# Patient Record
Sex: FEMALE | Race: WHITE | HISPANIC OR LATINO | Employment: UNEMPLOYED | URBAN - METROPOLITAN AREA
[De-identification: names, ages, dates, MRNs, and addresses within clinical notes are randomized per-mention and may not be internally consistent; named-entity substitution may affect disease eponyms.]

---
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
## 2019-06-06 ENCOUNTER — COMMUNICATION - HEALTHEAST (OUTPATIENT)
Dept: PEDIATRICS | Facility: CLINIC | Age: 19
End: 2019-06-06

## 2019-07-10 ENCOUNTER — COMMUNICATION - HEALTHEAST (OUTPATIENT)
Dept: PEDIATRICS | Facility: CLINIC | Age: 19
End: 2019-07-10

## 2020-01-11 ENCOUNTER — OFFICE VISIT - HEALTHEAST (OUTPATIENT)
Dept: FAMILY MEDICINE | Facility: CLINIC | Age: 20
End: 2020-01-11

## 2020-01-11 DIAGNOSIS — H65.191 OTHER NON-RECURRENT ACUTE NONSUPPURATIVE OTITIS MEDIA OF RIGHT EAR: ICD-10-CM

## 2020-01-11 DIAGNOSIS — J10.1 INFLUENZA DUE TO INFLUENZA VIRUS, TYPE A, HUMAN: ICD-10-CM

## 2020-01-11 DIAGNOSIS — R05.9 COUGH: ICD-10-CM

## 2020-01-11 LAB
DEPRECATED S PYO AG THROAT QL EIA: NORMAL
FLUAV AG SPEC QL IA: ABNORMAL
FLUBV AG SPEC QL IA: ABNORMAL

## 2020-01-11 RX ORDER — IBUPROFEN 200 MG
200 TABLET ORAL EVERY 6 HOURS PRN
Status: SHIPPED | COMMUNITY
Start: 2020-01-11

## 2020-01-11 RX ORDER — ACETAMINOPHEN 325 MG/1
650 TABLET ORAL EVERY 6 HOURS PRN
Status: SHIPPED | COMMUNITY
Start: 2020-01-11

## 2020-01-12 LAB — GROUP A STREP BY PCR: NORMAL

## 2021-05-30 VITALS — WEIGHT: 112.5 LBS | BODY MASS INDEX: 19.93 KG/M2

## 2021-05-30 VITALS — HEIGHT: 63 IN | BODY MASS INDEX: 19.51 KG/M2 | WEIGHT: 110.1 LBS

## 2021-05-30 VITALS — BODY MASS INDEX: 20.42 KG/M2 | WEIGHT: 115.3 LBS

## 2021-05-30 NOTE — PROGRESS NOTES
General Health Maintenance letter sent julius Rivera.    Tara AGUAYO CMA ([facility])

## 2021-05-31 VITALS — HEIGHT: 63 IN | WEIGHT: 109 LBS | BODY MASS INDEX: 19.31 KG/M2

## 2021-05-31 VITALS — WEIGHT: 112.3 LBS | BODY MASS INDEX: 19.9 KG/M2 | HEIGHT: 63 IN

## 2021-05-31 VITALS — BODY MASS INDEX: 19.26 KG/M2 | WEIGHT: 109.6 LBS

## 2021-05-31 VITALS — BODY MASS INDEX: 19.56 KG/M2 | WEIGHT: 111.3 LBS

## 2021-05-31 VITALS — WEIGHT: 112.9 LBS | BODY MASS INDEX: 19.84 KG/M2

## 2021-05-31 VITALS — BODY MASS INDEX: 20.4 KG/M2 | WEIGHT: 116.1 LBS

## 2021-05-31 VITALS — BODY MASS INDEX: 19.84 KG/M2 | HEIGHT: 63 IN | WEIGHT: 112 LBS

## 2021-06-01 VITALS — BODY MASS INDEX: 19.18 KG/M2 | WEIGHT: 108.3 LBS

## 2021-06-01 VITALS — BODY MASS INDEX: 19.03 KG/M2 | HEIGHT: 63 IN | WEIGHT: 107.4 LBS

## 2021-06-04 VITALS
TEMPERATURE: 99 F | DIASTOLIC BLOOD PRESSURE: 68 MMHG | HEART RATE: 94 BPM | RESPIRATION RATE: 16 BRPM | WEIGHT: 120 LBS | BODY MASS INDEX: 21.09 KG/M2 | SYSTOLIC BLOOD PRESSURE: 114 MMHG | OXYGEN SATURATION: 96 %

## 2021-06-08 ENCOUNTER — OFFICE VISIT - HEALTHEAST (OUTPATIENT)
Dept: FAMILY MEDICINE | Facility: CLINIC | Age: 21
End: 2021-06-08

## 2021-06-08 DIAGNOSIS — R10.84 ABDOMINAL PAIN, GENERALIZED: ICD-10-CM

## 2021-06-08 LAB
ANION GAP SERPL CALCULATED.3IONS-SCNC: 10 MMOL/L (ref 5–18)
BUN SERPL-MCNC: 5 MG/DL (ref 8–22)
CALCIUM SERPL-MCNC: 9.2 MG/DL (ref 8.5–10.5)
CHLORIDE BLD-SCNC: 108 MMOL/L (ref 98–107)
CO2 SERPL-SCNC: 23 MMOL/L (ref 22–31)
CREAT SERPL-MCNC: 0.63 MG/DL (ref 0.6–1.1)
ERYTHROCYTE [DISTWIDTH] IN BLOOD BY AUTOMATED COUNT: 12.6 % (ref 11–14.5)
GFR SERPL CREATININE-BSD FRML MDRD: >60 ML/MIN/1.73M2
GLUCOSE BLD-MCNC: 69 MG/DL (ref 70–125)
HCT VFR BLD AUTO: 37.9 % (ref 35–47)
HGB BLD-MCNC: 12.7 G/DL (ref 12–16)
MCH RBC QN AUTO: 29.9 PG (ref 27–34)
MCHC RBC AUTO-ENTMCNC: 33.5 G/DL (ref 32–36)
MCV RBC AUTO: 89 FL (ref 80–100)
PLATELET # BLD AUTO: 251 THOU/UL (ref 140–440)
PMV BLD AUTO: 10.3 FL (ref 7–10)
POTASSIUM BLD-SCNC: 4.1 MMOL/L (ref 3.5–5)
RBC # BLD AUTO: 4.25 MILL/UL (ref 3.8–5.4)
SODIUM SERPL-SCNC: 141 MMOL/L (ref 136–145)
WBC: 6.3 THOU/UL (ref 4–11)

## 2021-06-08 RX ORDER — DEXTROAMPHETAMINE SACCHARATE, AMPHETAMINE ASPARTATE, DEXTROAMPHETAMINE SULFATE AND AMPHETAMINE SULFATE 3.75; 3.75; 3.75; 3.75 MG/1; MG/1; MG/1; MG/1
15 TABLET ORAL PRN
Status: SHIPPED | COMMUNITY
Start: 2021-06-08

## 2021-06-08 ASSESSMENT — MIFFLIN-ST. JEOR: SCORE: 1266.2

## 2021-06-08 NOTE — PROGRESS NOTES
[facility] Pediatric Acute Visit     HPI:  Stefanie Sinclair is a 16 y.o. female who presents to the clinic with a four day history fever and sore throat. Fever has been up to 104 F. She's also had chills and body aches. Family has been giving either acetaminophen (500 mg) or ibuprofen (400 mg)every four hours, alternating. Stefanie noticed that her tonsils looked bigger and had white spots on them. Her voice sounds different, and has noticed swollen lymph nodes in her neck. She's had a mild cough, no significant nasal congestion. She denies abdominal pain, vomiting or diarrhea. No known sick contacts.     Past Med / Surg History:  Past Medical History   Diagnosis Date     Chicken pox      History of sprained elbow 2012     left     Viral Infection      Created by Conversion      No past surgical history on file.    Fam / Soc History:  Family History   Problem Relation Age of Onset     Hypertension Mother      Hypertension Maternal Grandmother      Diabetes Maternal Grandfather      Prostate cancer Maternal Grandfather      ADD / ADHD Father      Alcohol abuse Father      Anxiety disorder Father      ADD / ADHD Brother      Tuberculosis Paternal Grandfather      Diabetes Paternal Grandfather      Schizophrenia Paternal Grandmother      Anxiety disorder Paternal Grandmother      Social History     Social History Narrative    She lives at home with her mother during the week and sees her father on the weekends. Her parents  6 years ago. She has an older brother and 2 younger brothers.         She is a senior at [employer].         She was born in [place]     ROS:  Gen: See HPI  Eyes: No eye discharge  ENT: See HPI  Resp: No SOB, cough or wheezing  GI: No diarrhea, nausea or vomiting  : No dysuria  MS: Positive for myalgias  Skin: No rashes  Neuro: No headaches  Lymph/Hematologic: See HPI     Objective:  Vitals:   Visit Vitals     /66     Temp 98.2  F (36.8  C) (Oral)     Wt 112 lb  8 oz (51 kg)     Gen: Alert, well appearing  ENT: TMs normal bilaterally, no nasal congestion or rhinorrhea, tonsils are enlarged and erythematous with exudates, moist mucosa  Eyes: Conjunctivae clear bilaterally  Heart: Regular rate and rhythm; normal S1 and S2; no murmurs, gallops, or rubs  Lungs: Unlabored respirations; clear breath sounds  Skin: Normal without lesions  Hematologic/Lymph/Immune: Bilateral cervical and submandibular lymphadenopathy    Pertinent results / imaging:  Rapid Strep Antigen:  Positive    Assessment and Plan:    Stefanie Sinclair is a 16  y.o. 3  m.o. female with what is likely a viral illness causing fever, tonsillitis, myalgias and lymphadenopathy. Rapid strep was negative. Offered swabbing for influenza or testing for mono, but at this point, encouraged watchful waiting as she's outside the window for treating influenza, and she's perhaps a little early into illness to rely on monospot.      Supportive treatment including fluids, rest and analgesics    Follow up throat culture    Encouraged follow up if no better through the end of the week, sooner if she gets worse    Sharmaine Carrizales MD  1/24/2017

## 2021-06-09 NOTE — PROGRESS NOTES
[facility] Pediatric Acute Visit     HPI:  Stefanie Sinclair is a 16 y.o. female who presents to the clinic with at least a three week history of sore throat. She was seen on 1/24/17, with a sore throat, and she tested negative for strep and mono. I diagnosed her with a viral URI. After seeing her, her pharyngitis got better for a few days, then recurred. She's also noticed a dry cough for over a week along with rhinorrhea and some nasal congestion. She's also had some eye redness and pain. She's sneezing more as well. No fever or otalgia. No appetite changes, vomiting or diarrhea. She has no known sick contacts.    Stefanie also carries a diagnosis for ADHD, combined type, diagnosed at [facility] I believe in March of 2015. Stefanie was on Concerta from May 2015 - April 2016, but it sounds like Stefanie wasn't sure how well it worked, plus she developed a rash on her face. Stefanie saw Dr. Mueller on 8/25/16, and she started Stefanie on Adderall XR, 10 mg in the morning. Stefanie never filled this prescription as her mom really has wanted to avoid these medications for Stefanie. However, family has become increasingly concerned that Stefanie is not doing well in school because of her inability to focus. She has trouble completing her exams as well as her homework. She feels easily distracted, and it is hard to sit still to focus. They would like to try the Adderall at this point.    Past Med / Surg History:  Past Medical History:   Diagnosis Date     Chicken pox      History of sprained elbow 2012    left     Viral Infection     Created by Conversion      No past surgical history on file.    Fam / Soc History:  Family History   Problem Relation Age of Onset     Hypertension Mother      Hypertension Maternal Grandmother      Diabetes Maternal Grandfather      Prostate cancer Maternal Grandfather      ADD / ADHD Father      Alcohol abuse Father      Anxiety disorder Father      ADD / ADHD Brother      Tuberculosis  Paternal Grandfather      Diabetes Paternal Grandfather      Schizophrenia Paternal Grandmother      Anxiety disorder Paternal Grandmother      Social History     Social History Narrative    She lives at home with her mother during the week and sees her father on the weekends. Her parents  6 years ago. She has an older brother and 2 younger brothers.         She is a senior at [employer].         She was born in [place]     ROS:  Gen: No fever or fatigue  Eyes: See HPI  ENT: See HPI  Resp: No SOB, cough or wheezing.  GI:No diarrhea, nausea or vomiting  :No dysuria  MS: No joint/bone/muscle tenderness.  Skin: No rashes  Neuro: No headaches  Lymph/Hematologic: No gland swelling    Objective:  Vitals:   Visit Vitals     BP 98/64     Pulse 80     Temp 97.9  F (36.6  C) (Oral)     Wt 115 lb 4.8 oz (52.3 kg)     SpO2 100%     Gen: Alert, well appearing  ENT: TMs normal bilaterally; no significant nasal congestion or rhinorrhea; posterior pharynx mildly erythematous without exudates, tonsils are hypertrophied, grade 3; moist mucosa  Eyes: Conjunctivae clear bilaterally  Heart: Regular rate and rhythm; normal S1 and S2; no murmurs, gallops, or rubs  Lungs: Unlabored respirations; clear breath sounds, no crackles or wheezing  Skin: Normal without lesions  Neuro: Oriented. Normal reflexes; normal tone; no focal deficits appreciated. Appropriate for age.  Hematologic/Lymph/Immune: Submandibular cervical lymphadenopathy  Psychiatric: Appropriate affect    Pertinent results / imaging:  Reviewed note from Dr. Mueller from 8/25/16    Assessment and Plan:    Stefanie Sinclair is a 16  y.o. 5  m.o. female with persistent pharyngitis along with dry cough, rhinorrhea, sneezing and eye symptoms that I suspect are secondary to seasonal/environmental allergies and allergic rhinitis. Her tonsils are hypertrophied and seem to be chronically enlarged per family's report. Stefanie also has a diagnosis of ADHD,  combined type, and family would like to try the Adderall they were prescribed back in August, but never filled.      Recommended Flonase 50 mcg/actuation nasal spray per nostril daily; I did prescribe this but warned family that it may not be covered since it is over the counter    Okay to try cetrizine or loratadine daily as well    Referral ordered to ENT regarding tonsillar hypertrophy, but can also discuss chronic pharyngitis if treatment for allergies is not helpful    Refilled Adderall XR 10 mg 24 hr capsule, take 1 capsul (10 mg) by mouth every morning    Counseled family about following up in 3-4 weeks for a med check; also that this prescription is filled on a monthly basis, so they need to call every month for refill    Family will call if they are having any issues with this prescription, either side effects or ineffectiveness    Family counseled on this being controlled substance, and they agree to use this medication as prescribed only for Stefanie Carrizales MD  3/15/2017

## 2021-06-10 NOTE — PROGRESS NOTES
[facility] Pediatric Acute Visit     HPI:  Stefanie Sinclair is a 16 y.o. female with ADHD, combined type, who presents to the clinic with a 10 week history of intermittent cough that never has fully resolved. It seemed to have gotten worse again several days ago, and she had sweats and chills. Cough is dry and happens day and night, but is perhaps worse at night. She's also getting a headache now and abdominal pain from coughing so hard. She vomited a couple times several days ago, but this resolved. No diarrhea. Her appetite is normal. She's had nasal congestion and rhinorrhea. She's also noticed that her tonsils feel more swollen than usual; she has chronic tonsillar hypertrophy for which she is seeing ENT on 5/25/17. Her throat also feels sore from coughing; it feels like it's burning when she coughs. I saw her in mid-March, and suggested she try OTC seasonal allergy treatments, like Flonase and/or cetirizine or loratadine. She reports that she tried this, and they perhaps helped the nasal congestion. Cough hasn't improved much. Mom had a cough as well one or two weeks ago, and it improved with azithromycin.     I also asked Stefanie how things were going on 20 mg Adderall XR. We went up on her dose last month from 10 mg to 20 mg. She thinks it's helping, but it wears off toward the end of the day. Her afternoon teachers seem to be reminding her more to stay on task. She hasn't noticed any decrease in appetite. Sleep has been unaffected. She is open to trying an increased dose to 30 mg daily.    Past Med / Surg History:  Past Medical History:   Diagnosis Date     Chicken pox      History of sprained elbow 2012    left     Viral Infection     Created by Conversion      No past surgical history on file.    Fam / Soc History:  Family History   Problem Relation Age of Onset     Hypertension Mother      Hypertension Maternal Grandmother      Diabetes Maternal Grandfather      Prostate cancer Maternal Grandfather       ADD / ADHD Father      Alcohol abuse Father      Anxiety disorder Father      ADD / ADHD Brother      Tuberculosis Paternal Grandfather      Diabetes Paternal Grandfather      Schizophrenia Paternal Grandmother      Anxiety disorder Paternal Grandmother      Social History     Social History Narrative    She lives at home with her mother during the week and sees her father on the weekends. Her parents  6 years ago. She has an older brother and 2 younger brothers.         She is a senior at [employer].         She was born in [place]     ROS:  Gen: See HPI  Eyes: No eye discharge  ENT: See HPI  Resp: See HPI  GI: See HPI  : No dysuria  MS: No joint/bone/muscle tenderness  Skin: No rashes  Neuro: See HPI  Lymph/Hematologic: No gland swelling    Objective:  Vitals: BP 94/52  Pulse 92  Temp 98.2  F (36.8  C) (Oral)   Wt 109 lb 9.6 oz (49.7 kg)  SpO2 100%    Gen: Alert, well appearing  ENT: TMs normal bilaterally; no significant nasal congestion or rhinorrhea; tonsils are hypertrophied to 3+ bilaterally, not particularly erythematous; moist mucosa  Eyes: Conjunctivae clear bilaterally  Heart: Regular rate and rhythm; normal S1 and S2; no murmurs, gallops, or rubs  Lungs: Unlabored respirations; clear breath sounds, no crackles or wheezing  Skin: Normal without lesions  Hematologic/Lymph/Immune: No significant cervical lymphadenopathy  Psychiatric: Appropriate affect    Pertinent results / imaging:  Reviewed notes from 3/15/17 and 4/19/17    PHQ-9 Score:  5  CEE-7 Score:  8    Assessment and Plan:    Stefanie Sinclair is a 16  y.o. 7  m.o. female with 10 week history of intermittent cough that has never fully resolved despite trial of supportive care including OTC treatment for allergic rhinitis. At this point, I will treat her for atypical pneumonia given duration and since she's also had some fever, chills and headache with it. In terms of her ADHD, she's doing well on 20 mg  Adderall, but she feels like it is wearing off mid-afternoon. Willing to try increasing dose.      Prescribed azithromycin 250 mg tablets, take 500 mg (2 tablets) on day 1, then 250 mg (1 tablet) on days 2-5    Continue supportive care including fluids, rest and analgesics as needed    Family will call if cough doesn't show any sign if improvement after completing course of antibiotic    Will increase Adderall to 30 mg daily; dextroamphetamine-amphetamine (Adderall XR) 10 mg 24 hr capsule, take 3 capsules (30 mg total) by mouth daily, dispense 90 capsules with no refills; contacted [facility] since family had issues with insurance, and they may need prior authorization; able to  refill on 5/21/17    Will contact family in 2-3 weeks to follow up on how things are going    Will follow up on ENT's recommendation for tonsillectomy    Sharmaine Carrizales MD  5/18/2017

## 2021-06-10 NOTE — PROGRESS NOTES
[facility] Pediatric Acute Visit     HPI:  Stefanie Sinclair is a 16 y.o.  female with anxiety and ADHD on Adderall XR 10 mg who presents to the clinic for a medication check. Stefanie was diagnosed with ADHD by [facility] in March of 2015, I believe. She was on Concerta from May 2015 - April 2016, and benefits weren't obvious. She met with Dr. Annabel Mueller August 25, 2016, and Dr. Mueller prescribed Adderall XR 10 mg in the morning. However, mom deferred starting the medication as she is wary of prescription drugs. Over the past year, Stefanie began struggling more in school, and teachers were sending home notes with concerns that she wasn't focusing or completing her work in class. She also was socializing a great deal in class. Thus, family decided that it was time to try the medication. I re-prescribed Adderall XR 10 mg in March 2017.     Stefanie has been on this for about a month now, and isn't sure how helpful it is. She thinks things went really well for the first few days on the medication, but benefits have tapered off. She still finds it hard to focus in class and struggles to get her work done. Mom agrees that there hasn't been a noticeable change.    She takes the Adderall around 9 am when she wakes up for the day. She doesn't take it on weekends. She doesn't think the medication has affected her appetite. She doesn't eat breakfast, but thinks she eats a normal lunch and dinner. No issues with sleep; she sleeps from about midnight until 9 am, and her classes start after 10 am. She is a senior this year, and plans on taking classes about realty after she graduates, so she plans on continuing the medication through the summer months.     Stefanie is in therapy weekly. Her focuses with the therapist are primarily anxiety and helping with organization skills. This is going well for her.    Past Med / Surg History:  Past Medical History:   Diagnosis Date     Chicken pox      History of sprained elbow  "2012    left     Viral Infection     Created by Conversion      No past surgical history on file.    Fam / Soc History:  Family History   Problem Relation Age of Onset     Hypertension Mother      Hypertension Maternal Grandmother      Diabetes Maternal Grandfather      Prostate cancer Maternal Grandfather      ADD / ADHD Father      Alcohol abuse Father      Anxiety disorder Father      ADD / ADHD Brother      Tuberculosis Paternal Grandfather      Diabetes Paternal Grandfather      Schizophrenia Paternal Grandmother      Anxiety disorder Paternal Grandmother      Social History     Social History Narrative    She lives at home with her mother during the week and sees her father on the weekends. Her parents  6 years ago. She has an older brother and 2 younger brothers.         She is a senior at [employer].         She was born in [place]     ROS:  Gen: No fever or fatigue  Eyes: No eye discharge  ENT: No nasal congestion or rhinorrhea. No pharyngitis. No otalgia.  Resp: No SOB, cough or wheezing  GI: No diarrhea, nausea or vomiting  : No dysuria  MS: No joint/bone/muscle tenderness  Skin: No rashes  Neuro: No headaches  Lymph/Hematologic: No gland swelling    Objective:  Vitals: /62  Ht 5' 3.25\" (1.607 m)  Wt 110 lb 1.6 oz (49.9 kg)  BMI 19.35 kg/m2    Gen: Alert, well appearing  ENT: No nasal congestion or rhinorrhea, moist mucosa  Eyes: Conjunctivae clear bilaterally.  Heart: Regular rate and rhythm; normal S1 and S2; no murmurs, gallops, or rubs  Lungs: Unlabored respirations; clear breath sounds  Skin: Normal without lesions  Psychiatric: Appropriate affect    Pertinent results / imaging:  Reviewed my previous note from 3/15/17    Assessment and Plan:    Stefanie Sinclair is a 16  y.o. 6  m.o. female with anxiety and ADHD, combined type, that is not benefiting from Adderall XR 10 mg daily. At this point, family is willing to try increasing her to 20 mg daily, but mom " is concerned about Stefanie's weight and other side effects of the medication. She is down about 5 pounds since last month, but her weight has fluctuated a few pounds here and there over the last year. We will keep an eye on weight and appetite.       Prescribed Adderall XR 10 mg 24 hr capsule, take 2 capsules (20 mg) by mouth every morning, dispense 60 capsules, 0 refills    Will contact Stefanie in 1-2 weeks to see how she is doing at this dose, but family will call sooner if there are concerns    Continue therapy     Follow up for med check in 3 months    Sharmaine Carrizales MD  4/19/2017

## 2021-06-10 NOTE — PROGRESS NOTES
HPI: This patient is a 15yo F who presents for evaluation of the tonsils. There have been multiple sore throats throughout the last several years leading to missed activities/school. She also has frequent tonsil stones and halitosis. The tonsils are large, causing her to snore at night, and they touch in the middle when she is sick. Otherwise healthy and without fever, weight loss, odynophagia, dysphagia, hemoptysis, shortness of breath, or other major symptoms.    Past medical history, surgical history, social history, family history, medications, and allergies have been reviewed with the patient and are documented above.    Review of Systems: a 10-system review was performed. Pertinent positives are noted in the HPI and on a separate scanned document in the chart.    PHYSICAL EXAMINATION:  GEN: no acute distress, normocephalic  EYES: extraocular movements are intact, pupils are equal and round. Sclera clear.   EARS: auricles are normally formed. The external auditory canals are clear with minimal to no cerumen. Tympanic membranes are intact bilaterally with no signs of infection, effusion, retractions, or perforations.  NOSE: anterior nares are patent. There are no masses or lesions. The septum is non-obstructing.  OC/OP: clear, dentition is in good repair. The tongue and palate are fully mobile and symmetric. Tonsils are 3.5+  NECK: soft and supple. No lymphadenopathy or masses. Airway is midline.  NEURO: CN II-XII are intact bilaterally. alert and oriented x 3. No nystagmus. Gait is normal.  PULM: breathing comfortably on room air, normal chest expansion with respiration  HEART: regular rate and rhythm, no peripheral edema    MEDICAL DECISION-MAKING: Stefanie is a 15yo F with chronic tonsillitis and tonsillar hypertrophy who would benefit on multiple levels from a tonsillectomy. The risks and benefits were discussed and the patient will schedule at their convenience.

## 2021-06-11 NOTE — PROGRESS NOTES
[facility] Pediatric Acute Visit     HPI:  Stefanie Sinclair is a 16 y.o. female who is 11 days out from adenotonsillectomy who is here with ongoing pharyngitis. She's been taking tramadol (50 mg), ibuprofen (400 mg) and acetaminophen (650 mg) every six hours since surgery. These are helping somewhat, but she's still experiencing a lot of pain that interfere with her ability to eat and drink. It also hurts to talk. The tramadol makes her feel dizzy and nauseated. No bleeding noted. The surgery was uncomplicated. She got a dose of prednisone post-op, which mom feels like was the most helpful medication thus far. Mom reports trying to call ENT regarding the pain, but states she didn't hear back, and the post-op appointment is not until the end of this month.    Past Med / Surg History:  Past Medical History:   Diagnosis Date     Chicken pox      History of sprained elbow 2012    left     Viral Infection     Created by Conversion      No past surgical history on file.    Fam / Soc History:  Family History   Problem Relation Age of Onset     Hypertension Mother      Hypertension Maternal Grandmother      Diabetes Maternal Grandfather      Prostate cancer Maternal Grandfather      ADD / ADHD Father      Alcohol abuse Father      Anxiety disorder Father      ADD / ADHD Brother      Tuberculosis Paternal Grandfather      Diabetes Paternal Grandfather      Schizophrenia Paternal Grandmother      Anxiety disorder Paternal Grandmother      Social History     Social History Narrative    She has an older brother and 2 younger brothers.          She was born in [place]     ROS:  Gen: No fever or fatigue  Eyes: No eye discharge  ENT: See HPI  Resp: No SOB, cough or wheezing  GI: No diarrhea, nausea or vomiting  : No dysuria  MS: No joint/bone/muscle tenderness  Skin: No rashes  Neuro: No headaches  Lymph/Hematologic: No noted gland swelling    Objective:  Vitals: /66  Temp 98.7  F (37.1  C) (Oral)   Wt 111 lb 4.8 oz  (50.5 kg)    Gen: Alert, well appearing  ENT: No nasal congestion or rhinorrhea; posterior pharynx is erythematous with granulation tissue, no bleeding moist mucosa  Eyes: Conjunctivae clear bilaterally  Heart: Regular rate and rhythm; normal S1 and S2; no murmurs, gallops, or rubs  Lungs: Unlabored respirations; clear breath sounds  Hematologic/Lymph/Immune: No cervical lymphadenopathy  Psychiatric: Appropriate affect    Pertinent results / imaging:  Reviewed Dr. Trivedi's op note from 6/30/17    Assessment and Plan:    Stefanie Sinclair is a 16  y.o. 8  m.o. female with ongoing pharyngitis eleven days after tonsillectomy and adenoidectomy. Counseled family that it takes a couple weeks often for pain to settle down. Mom is very concerned and apprehensive about narcotics, and would like to avoid them if possible. However, she is also concerned that Stefanie is not eating or drinking well. I spoke with Dr. Trivedi to see if a short steroid burst would be a reasonable option, and she was unsure if it would be helpful, but is okay with trying it. Family counseled that steroids are associated with delayed wound healing, so we have to weigh risks and benefits.      Prescribed prednisone 50 mg tablet, take 1 tablet by mouth daily for up to 5 days; okay to stop earlier if feeling better    Continue ibuprofen and acetaminophen as prescribed by Dr. Trivedi, but counseled that it is extremely important to maintain good hydration when on ibuprofen    If pain persists beyond the course of steroids and through another week, encouraged them to try calling Dr. Trivedi's office to check in    Sharmaine Carrizales MD  7/11/2017

## 2021-06-11 NOTE — PROGRESS NOTES
Subjective:     Chief Complaint: Pre-op evaluation    History of Present Illness:   Stefanie is a 16 year old female with history of ADHD and chronic tonsillar hypertrophy here for a pre-operative evaluation prior to tonsillectomy scheduled for 6/30/17, with Dr. Trivedi. Stefanie has had recurrent episodes of pharyngitis. She also snores and has had tonsilloliths. For this reason, she was referred to ENT for evaluation. She saw Dr. Trivedi on 5/25/17, and based on her evaluation, Stefanie was deemed a good candidate for tonsillectomy.    Stefanie hasn't had any signs or symptoms of illness recently. No fever, significant cough, vomiting or diarrhea. She has never before had anesthesia or sedation.    Scheduled Procedure: Tonsillectomy  Surgery Date:  6/30/2017  Surgery Location:  [facility] (# [phone])  Surgeon:  Dr. Trivedi    Current Outpatient Prescriptions   Medication Sig Dispense Refill     levonorgestrel-ethinyl estradiol (AVIANE,ALESSE,LESSINA) 0.1-20 mg-mcg per tablet Take 1 tablet by mouth daily.       dextroamphetamine-amphetamine (ADDERALL XR) 10 MG 24 hr capsule Take 3 capsules (30 mg total) by mouth daily. 90 capsule 0     fluticasone (FLONASE) 50 mcg/actuation nasal spray 1 spray into each nostril daily. 16 g 1     No current facility-administered medications for this visit.      No Known Allergies    Immunization History   Administered Date(s) Administered     BCG 1999     DTaP, historic 1999, 03/01/2000, 07/25/2000, 12/14/2005, 05/30/2006     HPV Quadrivalent 02/13/2015     Hep B, Peds, Historic 03/01/2000, 07/27/2000, 06/29/2001     HiB, historic 1999, 03/01/2000, 07/27/2000     IPV 1999, 1999, 03/01/2000, 05/27/2000, 02/23/2012     Influenza, inj, historic 11/01/2001, 02/23/2012     Influenza,live, Nasal Laiv4 10/18/2013, 02/13/2015     MMR 07/18/2001, 05/30/2006, 08/31/2011     Pneumo Conj 7-V(before 2010) 12/08/2001     Td, historic 02/23/2012     Varicella 08/31/2011        Patient Active Problem List   Diagnosis     Attention deficit hyperactivity disorder (ADHD), combined type     Generalized anxiety disorder     Adjustment disorder with depressed mood     Tonsillitis     Past Medical History:   Diagnosis Date     Chicken pox      History of sprained elbow 2012    left     Viral Infection     Created by Conversion        No past surgical history on file.    Social History     Social History Narrative    She lives at home with her mother during the week and sees her father on the weekends. Her parents  6 years ago. She has an older brother and 2 younger brothers.         She is a senior at [employer].         She was born in [place]     Most recent Health Maintenance Visit:  2 year(s) ago    Pertinent History   Prior Anesthesia: no  Previous Anesthesia Reaction:  no  Diabetes: no  Cardiovascular Disease: no  Pulmonary Disease: no  Renal Disease: no  GI Disease: no  Sleep Apnea: no  Clotting Disorder: no  Bleeding Disorder: no    No Family history of anesthesia reaction, MI, Stroke, Aneurysm, sudden death, clotting disorder and bleeding disorder    Social history of there are no concerns regarding care after surgery    After surgery, the patient plans to recover at home with family.    Any use of aspirin or ibuprofen within 7 days of surgery?  no  Anesthesia concerns/family history?:no  Exposure to tobacco smoke?: no    Exposure in the past 3 weeks to:   Chicken pox:  No  Fifth Disease:  No  Whooping Cough: No  Measles:  No  Tuberculosis: No  Other: No    Review of Systems  Constitutional (fever, wt. Loss, fatigue):  Normal  Respiratory: Normal  Cardiovascular: Normal  GI/Hepatic: Normal  Neuro: Normal  Urinary Tract/Renal: Normal  Endocrine: Normal  Mental/Development: Normal  Vision/Hearing: Wears corrective lenses  Musculoskeletal: Normal  Skin: Normal  Bleeding Disorder: Normal  Tobacco/Alcohol/Drug Use: Normal / NA    Objective:         Vitals:  "   06/07/17 1231   BP: 100/64   Pulse: 76   Temp: 98.1  F (36.7  C)   TempSrc: Oral   SpO2: 100%   Weight: 112 lb 4.8 oz (50.9 kg)   Height: 5' 3.25\" (1.607 m)     HEENT: Normocephalic, atraumatic, PERRL, EOMI, Normal pearly TMs bilaterally, tonsils are hypertrophied, 3+ bilaterally, moist mucosa  Neck/Thyroid: Supple  Chest:  Normal  Lungs:  Clear to auscultation bilaterally without wheezes, rales or rhonci  CV: RRR, normal S1 and S2, no murmurs  GI/Abdomen: Soft, nontender, nondistended, No HSM  Neurologic:  Normal tone in upper and lower extremities, DTRs 2+ in bilateral lower extremities, Appropriate for age  Mental Status: Normal  Muscular/Skeletal/Extremities: Normal  Skin/Hair/Nails: No rashes on the skin  Genitalia/: Normal  Lymphatic: Normal    Lab (hgb, A)/Studies (CXR, EKG, Head CT):  CBC pending; Urine pregnancy ordered to be done within 1 week of procedure    Assessment/Plan:      Visit for Preoperative Exam.    Patient approved for surgery with general or local anesthesia. Postoperative pain to be managed by surgeon during post-operative [employer] timeframe, typically 30-60 days for major surgery, and less for others. Labs will be done as indicated. Above recommendations were reviewed with the patient. Copy of the pre-op was given to the patient to bring along on the day of surgery. Follow up as needed.     Stefanie will follow up with us if she develops any signs or symptoms of developing illness prior to surgery date.      Sharmaine Carrizales   Pediatrician  [facility]  [phone]      "

## 2021-06-13 NOTE — PROGRESS NOTES
[facility] Pediatric Acute Visit     HPI:  Stefanie Sinclair is a 17 y.o.  female who presents to the clinic with mom.  Mom brings her in because she has had a cough for 5-7 days now.  Mom herself has a similar cough and was just seen yesterday and treated with Zithromax.  Both of them have a very dry, throaty, nonproductive cough.  She has almost cough to the point of vomiting but has not.  She has also been running a fever since yesterday.  She has no prior history of viral induced wheezing.  She denies headache, ear pain, and there is no abdominal pain vomiting or diarrhea.        Past Med / Surg History:  Past Medical History:   Diagnosis Date     Chicken pox      History of sprained elbow 2012    left     Viral Infection     Created by Conversion      Past Surgical History:   Procedure Laterality Date     TONSILLECTOMY AND ADENOIDECTOMY Bilateral 06/2017       Fam / Soc History:  Family History   Problem Relation Age of Onset     Hypertension Mother      Hypertension Maternal Grandmother      Diabetes Maternal Grandfather      Prostate cancer Maternal Grandfather      ADD / ADHD Father      Alcohol abuse Father      Anxiety disorder Father      ADD / ADHD Brother      Tuberculosis Paternal Grandfather      Diabetes Paternal Grandfather      Schizophrenia Paternal Grandmother      Anxiety disorder Paternal Grandmother      Social History     Social History Narrative    She has an older brother and 2 younger brothers.          She was born in [place]         ROS:  Gen: Positive for fever or fatigue  Eyes: No eye discharge.   ENT: To for nasal congestion or rhinorrhea and pharyngitis. No otalgia.  Resp: Positive for dry nonproductive cough  GI:No diarrhea, nausea or vomiting  :No dysuria  MS: No joint/bone/muscle tenderness.  Skin: No rashes  Neuro: No headaches  Lymph/Hematologic: No gland swelling      Objective:  Vitals: Pulse 80  Temp 98.8  F (37.1  C) (Oral)   Wt 112 lb 14.4 oz (51.2 kg)  SpO2  99%    Gen: Alert, well appearing  ENT: No nasal congestion or rhinorrhea. Oropharynx normal, moist mucosa.  TMs normal bilaterally.  Eyes: Conjunctivae clear bilaterally.   Heart: Regular rate and rhythm; normal S1 and S2; no murmurs, gallops, or rubs.  Lungs: Unlabored respirations; clear breath sounds.  There is no wheezing or crackles.  O2 sats are 99%.  She has a very dry throaty cough noted with exam.  Musculoskeletal: Joints with full range-of-motion. Normal upper and lower extremities.  Skin: Normal without lesions.  Neuro: Oriented. Normal reflexes; normal tone; no focal deficits appreciated. Appropriate for age.  Hematologic/Lymph/Immune: No cervical lymphadenopathy  Psychiatric: Appropriate affect      Pertinent results / imaging:  Reviewed     Assessment and Plan:    Stefanie Sinclair is a 17 y.o. female with:    1. Cough    I am going to go ahead and start her on Zithromax to cover for possible mycoplasma pneumonia.  She will receive 500 mg day 1 and 250 day 2 through 5.  I have given her a note for absence from work this week as she is only working 2 days and I feel that she needs to drink more fluids, suck on cough drops or lifesavers, to less talking to rest her voice.  If there is no improvement despite these measures or worsening cough she should be seen back for reevaluation and she and mom agree with that plan.        Negra Yang MD  11/6/2017

## 2021-06-13 NOTE — PROGRESS NOTES
HPI: This patient is an 18yo F who presents for a post-op visit s/p tonsillectomy. Doing well overall. Has returned to normal activity and normal diet. Sleeping quietly. No issues with infections. She had a rough post-op course in terms of pain, but is better from that regard now.    Social history, medications, and allergies have been reviewed with the patient and are documented above.    Review of Systems: an ENT specific review of systems is normal    PHYSICAL EXAMINATION:  GEN: no acute distress, normocephalic  OC/OP: clear, dentition is in good repair. The tongue and palate are fully mobile and symmetric. The tonsillar fossae are well-healed.  NECK: soft and supple. No lymphadenopathy or masses. Airway is midline.  PULM: breathing comfortably on room air, normal chest expansion with respiration    MEDICAL DECISION-MAKING: doing well s/p tonsillectomy. RTC PRN

## 2021-06-14 NOTE — PROGRESS NOTES
[facility] Pediatric Acute Visit     HPI:  Stefanie Sinclair is a 17 y.o. female who presents to the clinic with a one month history of cough that hasn't really improved despite supportive care and course of azithromycin. Cough is deep and varies between dry to more productive. Cough worse at night, and for several nights, she thinks she's been wheezing. She now also has a sore throat, she thinks perhaps from coughing so much, but it is really painful. She had a fever last night and this morning, but she isn't sure how high it was. She also has nasal congestion with facial pain and pressure. Her left ear also is painful. Her appetite has been okay. No vomiting or diarrhea. Her mom has also been sick for about a month and has been on different antibioticis.    Past Med / Surg History:  Past Medical History:   Diagnosis Date     Chicken pox      History of sprained elbow 2012    left     Viral Infection     Created by Conversion      Past Surgical History:   Procedure Laterality Date     TONSILLECTOMY AND ADENOIDECTOMY Bilateral 06/2017       Fam / Soc History:  Family History   Problem Relation Age of Onset     Hypertension Mother      Hypertension Maternal Grandmother      Diabetes Maternal Grandfather      Prostate cancer Maternal Grandfather      ADD / ADHD Father      Alcohol abuse Father      Anxiety disorder Father      ADD / ADHD Brother      Tuberculosis Paternal Grandfather      Diabetes Paternal Grandfather      Schizophrenia Paternal Grandmother      Anxiety disorder Paternal Grandmother      Social History     Social History Narrative    She has an older brother and 2 younger brothers.          She was born in [place]     ROS:  Gen: See HPI  Eyes: No eye discharge  ENT: See HPI  Resp: See HPI  GI: No diarrhea, nausea or vomiting  : No dysuria  MS: No joint/bone/muscle tenderness  Skin: No rashes  Neuro: No headaches  Lymph/Hematologic: No known gland swelling    Objective:  Vitals: /56 (Patient  Site: Left Arm, Patient Position: Sitting)  Pulse 88  Temp 98.7  F (37.1  C)  Wt 116 lb 1.6 oz (52.7 kg)  SpO2 100%  Breastfeeding? No    Gen: Alert, well appearing  ENT: TMs normal bilaterally; audible nasal congestion with tenderness with palpation along maxillary sinuses, oropharynx normal, moist mucosa  Eyes: Conjunctivae clear bilaterally  Heart: Regular rate and rhythm; normal S1 and S2; no murmurs, gallops, or rubs  Lungs: Unlabored respirations; clear breath sounds; no crackles or wheezing  Skin: Normal without lesions  Hematologic/Lymph/Immune: Bilateral cervical lymphadenopathy  Psychiatric: Appropriate affect    Pertinent results / imaging:  Reviewed     Rapid Strep Antigen:  Negative    Assessment and Plan:    Stefanie Sinclair is a 17 y.o. female with sinusitis and persistent cough despite course of azithromycin. Will try treating sinus infection and trial of albuterol to settle bronchospasm. Rapid strep negative, so sore throat likely post-nasal drip vs new viral illness.      Prescribed Augmentin 875-125 mg tablet, take 1 tablet by mouth twice a day for 7 days    Prescribed albuterol 90 mcg/actuation inhaler, take 2 puffs every 4 hours as needed. Provided aerochamber without mask and encouraged consistent use. Recommended taking this medication every 4-6 hours for now while coughing.    Supportive care including fluids, rest, nasal irrigation (saline vs Neti pot) with gentle blowing and analgesics as needed    Follow up if no improvement noted after completing course of antibiotics, sooner if getting worse    Sharmaine Carrizales MD  12/5/2017

## 2021-06-15 PROBLEM — J03.90 TONSILLITIS: Status: ACTIVE | Noted: 2017-01-25

## 2021-06-15 NOTE — PROGRESS NOTES
[facility] Pediatric Acute Visit     HPI:  Stefanie Sinclair is a 17 y.o. female with ADHD, combined type, anxiety and depressionwho presents to the clinic for a medication check. She needs a refill of her Adderall. She was on 30 mg extended release, last taken around August 2017. She doesn't take it unless she's in school, and she's been out of school until recently. She's at [employer] doing general classes with intention to specialize in interior design.    Stefanie was diagnosed with ADHD by [facility] in March of 2015, I believe. She was on Concerta from May 2015 - April 2016, and benefits weren't obvious. She met with Dr. Annabel Mueller August 25, 2016, and Dr. Mueller prescribed Adderall XR 10 mg in the morning. However, mom deferred starting the medication as she is wary of prescription drugs. Last year, Stefanie began struggling more in school, and teachers were sending home notes with concerns that she wasn't focusing or completing her work in class. She also was socializing a great deal in class. Thus, family decided that it was time to try the medication. I re-prescribed Adderall XR 10 mg in March 2017. This didn't seem to be adequate, so we went up to 20 mg, then finally to 30 mg since May, 2017.     She thinks this is a good dose for her. It doesn't cause appetite suppression or lead to sleep difficulty. She only takes it when she needs it. She's even wondering if she should go to the short-acting form so that she can just take it right before class if needed.    She's also asking for refill of OCP. It was started by [facility] she thinks about a month ago. The last time she was there was about two months ago. At this time, she was tested for chlamydia, gonorrhea and pregnancy, all negative. She hasn't had sex since this appointment. When she is sexually active, she has partner use condom. She admits not being adherent with taking pill at same time daily, but usually remembers to  "take it at some point during the day. No issues with headache or nausea. Occasional spotting. She typically takes it for three months straight (without taking hormone-free week), then will have period after that third month. Would like to continue this dosing schedule.    Past Med / Surg History:  Past Medical History:   Diagnosis Date     Chicken pox      History of sprained elbow 2012    left     Viral Infection     Created by Conversion      Past Surgical History:   Procedure Laterality Date     TONSILLECTOMY AND ADENOIDECTOMY Bilateral 06/2017       Fam / Soc History:  Family History   Problem Relation Age of Onset     Hypertension Mother      Hypertension Maternal Grandmother      Diabetes Maternal Grandfather      Prostate cancer Maternal Grandfather      ADD / ADHD Father      Alcohol abuse Father      Anxiety disorder Father      ADD / ADHD Brother      Tuberculosis Paternal Grandfather      Diabetes Paternal Grandfather      Schizophrenia Paternal Grandmother      Anxiety disorder Paternal Grandmother      Social History     Social History Narrative    She has an older brother and 2 younger brothers.          She was born in [place]     ROS:  Gen: No fever or fatigue  Eyes: No eye discharge  ENT: No nasal congestion or rhinorrhea. No pharyngitis. No otalgia.  Resp: No SOB, cough or wheezing  GI: No diarrhea, nausea or vomiting  : No dysuria  MS: No joint/bone/muscle tenderness  Skin: No rashes  Neuro: No headaches  Lymph/Hematologic: No gland swelling    Objective:  Vitals: /58  Pulse 80  Ht 5' 3\" (1.6 m)  Wt 112 lb (50.8 kg)  LMP 01/04/2018 (Approximate)  BMI 19.84 kg/m2    Gen: Alert, well appearing  ENT: No nasal congestion or rhinorrhea; oropharynx normal, moist mucosa  Eyes: Conjunctivae clear bilaterally  Heart: Regular rate and rhythm; normal S1 and S2; no murmurs, gallops, or rubs  Lungs: Unlabored respirations; clear breath sounds  Abdomen: Soft, non-distended, non-tender  Skin: Normal " without lesions  Neuro: Oriented; PERRL, normal reflexes; normal tone; no focal deficits appreciated. Appropriate for age.  Psychiatric: Appropriate affect    Pertinent results / imaging:  Reviewed     Assessment and Plan:    Stefanie Sinclair is a 17 y.o. female with ADHD, combined type, here for a medication check. She's been off her Adderall XR 30 mg daily for several months as she only takes it when she's in school. Now that she's back in school, needs refill. Considered doing short acting, but will stick with long acting for now. If she needs an extra short acting dose in afternoon, we can discuss this.     Refilled Adderall XR 30 mg capsule, take 1 capsule (30 mg) by mouth every morning    Follow up for med check in 3-4 months, sooner if needed    In terms of OCP, I can take over management on this. She's happy with her current OCP, and had recent pregnancy, GC[facility]Chlamydia testing at [facility]. No sexual activity since this testing. Uses condoms. Counseled on importance of this for STI protection. Also encouraged her to take the OCP at the same time daily. If she thinks a different form of contraception may be better fit (IUD, Implanon, etc), will follow up with GYN.    Refilled Aviane 0.1-20 mg-mcg per tablet, take 1 tablet daily; will send through enough so she can skip her period week as desired    Follow up for this at next [facility], sooner with concerns    Sharmaine Carrizales MD  1/18/2018

## 2021-06-17 NOTE — PATIENT INSTRUCTIONS - HE
Patient Instructions by Dae Medina PA-C at 1/11/2020 10:20 AM     Author: Dae Medina PA-C Service: -- Author Type: Physician Assistant    Filed: 1/11/2020 12:03 PM Encounter Date: 1/11/2020 Status: Addendum    : Dae Medina PA-C (Physician Assistant)    Related Notes: Original Note by Dae Medina PA-C (Physician Assistant) filed at 1/11/2020 12:03 PM       Your rapid influenza test came back positive for flu. You are contagious until your fever is gone for 24 hours. Maintain good hand hygiene, cover your cough, and limit contact to prevent spreading the illness. Symptoms typically last 1-2 weeks.    Symptom management:  - Drink plenty of fluids and allow for plenty of rest  - Use tylenol or ibuprofen every 4-6 hours for fever/discomfort    Reasons to be seen immediately for re-evaluation:  - Have trouble breathing or are short of breath  - Feel pain or pressure in your chest or belly  - Get suddenly dizzy  - Feel confused  - Have severe vomiting    If no symptom improvement in 1 week, follow-up with your primary care provider.      Patient Education     Influenza (Adult)    Influenza is also called the flu. It is a viral illness that affects the air passages of your lungs. It is different from the common cold. The flu can easily be passed from one to person to another. It may be spread through the air by coughing and sneezing. Or it can be spread by touching the sick person and then touching your own eyes, nose, or mouth.  The flu starts 1 to 3 days after you are exposed to the flu virus. It may last for 1 to 2 weeks but many people feel tired or fatigued for many weeks afterward. You usually dont need to take antibiotics unless you have a complication. This might be an ear or sinus infection or pneumonia.  Symptoms of the flu may be mild or severe. They can include extreme tiredness (wanting to stay in bed all day), chills, fevers, muscle aches, soreness with eye movement, headache, and a dry,  hacking cough.  Home care  Follow these guidelines when caring for yourself at home:    Avoid being around cigarette smoke, whether yours or other peoples.    Acetaminophen or ibuprofen will help ease your fever, muscle aches, and headache. Dont give aspirin to anyone younger than 18 who has the flu. Aspirin can harm the liver.    Nausea and loss of appetite are common with the flu. Eat light meals. Drink 6 to 8 glasses of liquids every day. Good choices are water, sport drinks, soft drinks without caffeine, juices, tea, and soup. Extra fluids will also help loosen secretions in your nose and lungs.    Over-the-counter cold medicines will not make the flu go away faster. But the medicines may help with coughing, sore throat, and congestion in your nose and sinuses. Dont use a decongestant if you have high blood pressure.    Stay home until your fever has been gone for at least 24 hours without using medicine to reduce fever.  Follow-up care  Follow up with your healthcare provider, or as advised, if you are not getting better over the next week.  If you are age 65 or older, talk with your provider about getting a pneumococcal vaccine every 5 years. You should also get this vaccine if you have chronic asthma or COPD. All adults should get a flu vaccine every fall. Ask your provider about this.  When to seek medical advice  Call your healthcare provider right away if any of these occur:    Cough with lots of colored mucus (sputum) or blood in your mucus    Chest pain, shortness of breath, wheezing, or trouble breathing    Severe headache, or face, neck, or ear pain    New rash with fever    Fever of 100.4 F (38 C) or higher, or as directed by your healthcare provider    Confusion, behavior change, or seizure    Severe weakness or dizziness    You get a new fever or cough after getting better for a few days  Date Last Reviewed: 1/1/2017 2000-2017 [employer]. [address] [place] [address], [place] [address].  "All rights reserved. This information is not intended as a substitute for professional medical care. Always follow your healthcare professional's instructions.            What You Should Know About Influenza (Flu) Antiviral Drugs  Can flu illness be treated?  Yes. There are prescription medications called \"antiviral drugs\" that can be used to treat flu illness.  What are antiviral drugs?  Influenza antiviral drugs are prescription medicines (pills, liquid, or an inhaled powder) that fight against flu in your body. Antiviral drugs are not sold over-the-counter. You can only get them if you have a prescription from a health care provider. Antiviral drugs are different from antibiotics, which fight against bacterial infections.   What should I do if I think I have the flu?   If you get sick with flu, antiviral drugs are a treatment option. Check with your health care provider promptly if you are at high risk of serious flu complications (see the next page for full list of high risk factors) and you get flu symptoms. Flu symptoms can include fever, cough, sore throat, runny or stuffy nose, body aches, headache, chills, and fatigue. Your doctor may prescribe antiviral drugs to treat your flu illness.  Should I still get a flu vaccine?  Yes. Antiviral drugs are not a substitute for getting a flu vaccine. While flu vaccine can vary in how well it works, a flu vaccine is the first and best way to prevent influenza. Antiviral drugs are a second line of defense to treat the flu if you get sick.  What are the benefits of antiviral drugs?  Antiviral treatment works best when started within two days of getting symptoms. Antiviral drugs can lessen fever and other symptoms, and shorten the time you are sick by about one day. They also can prevent serious flu complications, like pneumonia.   For people at high risk of serious flu complications, treatment with an antiviral drug can mean the difference between having a milder illness " versus a very serious illness that could result in a hospital stay. For adults hospitalized with flu illness, some studies have reported that early antiviral treatment can reduce the risk of death.  What antiviral drugs are recommended this flu season?  There are four FDA-approved antiviral drugs recommended by CDC this season: oseltamivir phosphate (available as a generic version or under the trade name Tamiflu ), zanamivir (trade name Relenza ), peramivir (trade name Rapivab ), and baloxavir marboxil (trade name Xofluza ).   Oseltamivir is available as a pill or liquid and zanamivir is a powder that is inhaled. (Zanamivir is not recommended for people with breathing problems like asthma or COPD). Peramivir is given intravenously by a health care provider, and baloxavir is a pill given as a single dose by mouth.  What are the possible side effects of antiviral drugs?   Side effects vary for each medication. For example, the most common side effects for oseltamivir are nausea and vomiting, zanamivir can cause bronchospasm, and peramivir can cause diarrhea.  Other less common side effects also have been reported. Your health care provider can give you more information about these drugs or you can check the Food and Drug Administration (FDA) website for specific information about antiviral drugs, including the 's package insert.   For more information, visit:www.cdc.gov/flu  or call 6-122-UVF-INFOCS HCVG-15-FLU-102 December 07, 2018   When should antiviral drugs be taken for treatment?  Studies show that flu antiviral drugs work best for treatment when they are started within two days of getting sick. However, starting them later can still be helpful, especially if the sick person is at high risk of serious flu complications or is very sick from the flu. Follow instructions for taking these drugs.  How long should antiviral drugs be taken?  To treat flu, oseltamvir and zanamivir are usually prescribed for  5 days, although people hospitalized with flu may need the medicine for longer than 5 days. Rapivab  is given intravenously for 15 to 30 minutes. Baloxavir is given in a single dose.  Can children take antiviral drugs?  Yes, though this varies by medication. Oseltamivir is recommended by CDC and the American Academy of Pediatrics (AAP) for early treatment of flu in people of any age, and for the prevention of flu in people 3 months and older. Zanamivir is recommended for early treatment of flu in people 7 years and older, and for the prevention of flu in people 5 years and older. Peramivir is recommended for early treatment in people 2 years and older. Baloxavir is recommended for early treatment of flu in people 12 years and older.  Can pregnant and breastfeeding women take antiviral drugs?  Oral oseltamivir is recommended for treatment of pregnant women with flu because compared to other recommended antiviral medications, it has the most studies available to suggest that it is safe and beneficial during pregnancy. Baloxavir is not recommended for pregnant women or breastfeeding mothers.  Who should take antiviral drugs?  It's very important that antiviral drugs be used early to treat people who are very sick with flu (for example, people who are in the hospital) and people who are sick with flu who are at high risk of serious flu complications, either because of their age or because they have a high risk medical condition. Other people also may be treated with antiviral drugs by their health care provider this season. Most people who are otherwise healthy and get the flu, however, do not need to be treated with antiviral drugs.  The following is a list of all the health and age factors that are known to increase a person's risk of getting serious complications from the flu:   Asthma   Blood disorders (such as sickle cell disease)  Chronic lung disease (such as chronic obstructive pulmonary disease [COPD] and  cystic fibrosis)  Endocrine disorders (such as diabetes mellitus)  People who are obese with a body mass index [BMI] of 40 or higher  Heart disease (such as congenital heart disease, congestive heart failure and coronary artery disease)   Kidney disorders  Liver disorders  Metabolic disorders (such as inherited metabolic disorders and mitochondrial disorders)  Neurologic and neurodevelopment conditions  People younger than 19 years old and on long-term aspirin or salicylate-containing medications  People with a weakened immune system due to disease (such as people with HIV or AIDS, or some cancers such as leukemia) or medications (such as those receiving chemotherapy or radiation treatment for cancer, or persons with chronic conditions requiring chronic corticosteroids or other drugs that suppress the immune system)    Other people at high risk from the flu:  Adults 65 years and older  Children younger than 2 years old1  Pregnant women and women up to 2 weeks after the end of pregnancy  American Indians and [place] Natives  People who live in nursing homes and other long-term-care facilities    1 Although all children younger than 5 years old are considered at high risk for serious flu complications, the highest risk is for those younger than 2 years old, with the highest hospitalization and death rates among infants younger than 6 months old.  It is especially important that these people get a flu vaccine and seek medical treatment quickly if they get flu syptoms            Suggested increased rest increase fluids and bedside humidification with ultrasonic humidifier  Over-the-counter Tylenol dosed by weight.  Follow packaging directions  Elevate head for comfort at nighttime  Antibiotic as written.  Indication for return was reviewed.      You have an infection of the middle ear, also called otitis media.    Treatment:  - Use antibiotics as prescribed until completion, even if symptoms improve  - May give tylenol  or ibuprofen for irritation and discomfort (see tables below for doses)  - Follow-up with their pediatrician in 10 days for another ear check  - Should notice symptom improvement in the next 36-48 hours    When to come back sooner for re-evaluation?  - If symptoms have not begun improving after 48 hours of taking antibiotics  - Develops a fever or current fever worsens  - Becomes short of breath  - Neck stiffness  - Difficulty swallowing   - Signs of dehydration including severe thirst, dark urine, dry skin, cracked lips      Patient Education     Otitis Media (Middle-Ear Infection) in Adults  Otitis media is another name for a middle-ear infection. It means an infection behind your eardrum. This kind of ear infection can happen after any condition that keeps fluid from draining from the middle ear. These conditions include allergies, a cold, a sore throat, or a respiratory infection.  Middle-ear infections are common in children, but they can also happen in adults. An ear infection in an adult may mean a more serious problem than in a child. So you may need additional tests. If you have an ear infection, you should see your health care provider for treatment.  What are the types of middle-ear infections?  Infections can affect the middle ear in several ways. They are:    Acute otitis media. This middle-ear infection occurs suddenly. It causes swelling and redness. Fluid and mucus become trapped inside the ear. You can have a fever and ear pain.    Otitis media with effusion. Fluid (effusion) and mucus build up in the middle ear after the infection goes away. You may feel like your middle ear is full. This can continue for months and may affect your hearing.    Chronic otitis media with effusion. Fluid (effusion) remains in the middle ear for a long time. Or it builds up again and again, even though there is no infection. This type of middle-ear infection may be hard to treat. It may also affect your hearing.  Who  is more likely to get a middle-ear infection?  You are more likely to get an ear infection if you:    Smoke or are around someone who smokes    Have seasonal or year-round allergy symptoms    Have a cold or other upper respiratory infection  What causes a middle-ear infection?  The middle ear connects to the throat by a canal called the eustachian tube. This tube helps even out the pressure between the outer ear and the inner ear. A cold or allergy can irritate the tube or cause the area around it to swell. This can keep fluid from draining from the middle ear. The fluid builds up behind the eardrum. Bacteria and viruses can grow in this fluid. The bacteria and viruses cause the middle-ear infection.  What are the symptoms of a middle-ear infection?  Common symptoms of a middle-ear infection in adults are:    Pain in 1 or both ears    Drainage from the ear    Muffled hearing    Sore throat   You may also have a fever. Rarely, your balance can be affected.  These symptoms may be the same as for other conditions. Its important to talk with your health care provider if you think you have a middle-ear infection. If you have a high fever, severe pain behind your ear, or paralysis in your face, see your provider as soon as you can.  How is a middle-ear infection diagnosed?  Your health care provider will take a medical history and do a physical exam. He or she will look at the outer ear and eardrum with an otoscope. The otoscope is a lighted tool that lets your provider see inside the ear. A pneumatic otoscope blows a puff of air into the ear to check how well your eardrum moves. If you eardrum doesnt move well, it may mean you have fluid behind it.  Your provider may also do a test called tympanometry. This test tells how well the middle ear is working. It can find any changes in pressure in the middle ear. Your provider may test your hearing with a tuning fork.  How is a middle-ear infection treated?  A middle-ear  infection may be treated with:    Antibiotics, taken by mouth or as ear drops    Medication for pain    Decongestants, antihistamines, or nasal steroids  Your health care provider may also have you try autoinsufflation. This helps adjust the air pressure in your ear. For this, you pinch your nose and gently exhale. This forces air back through the eustachian tube.  The exact treatment for your ear infection will depend on the type of infection you have. In general, if your symptoms dont get better in 48 to 72 hours, contact your health care provider.  Middle-ear infections can cause long-term problems if not treated. They can lead to:    Infection in other parts of the head    Permanent hearing loss    Paralysis of a nerve in your face  If you have a middle-ear infection that doesnt get better, you may need to see an ear, nose, and throat specialist (otolaryngologist). You may need a CT scan or MRI to check for head and neck cancer.  Ear tubes  Sometimes fluid stays in the middle ear even after you take antibiotics and the infection goes away. In this case, your health care provider may suggest that a small tube be placed in your ear. The tube is put at the opening of the eardrum. The tube keeps fluid from building up and relieves pressure in the middle ear. It can also help you hear better. This surgery is called myringotomy. It is not often done in adults.  The tubes usually fall out on their own after 6 months to a year.    [id number] [employer]. [address] [place] [address], [place] [address]. All rights reserved. This information is not intended as a substitute for professional medical care. Always follow your healthcare professional's instructions.

## 2021-06-18 NOTE — PROGRESS NOTES
Health system Pediatric Acute Visit     HPI:  Stefanie Sinclair is a 17 y.o.  female who presents to the clinic with a history of a cough, nasal congestion, and a sore throat for the last week.  All of her symptoms kind of occurred at the same time.  She has a younger brother who also has cold symptoms.  She is here today because the cough is worsening.  She has had no fevers with this.  She works at [employer] in because of the cough has needed to miss work yesterday and today.        Past Med / Surg History:  Past Medical History:   Diagnosis Date     Chicken pox      History of sprained elbow 2012    left     Viral Infection     Created by Conversion      Past Surgical History:   Procedure Laterality Date     TONSILLECTOMY AND ADENOIDECTOMY Bilateral 06/2017       Fam / Soc History:  Family History   Problem Relation Age of Onset     Hypertension Mother      Hypertension Maternal Grandmother      Diabetes Maternal Grandfather      Prostate cancer Maternal Grandfather      ADD / ADHD Father      Alcohol abuse Father      Anxiety disorder Father      ADD / ADHD Brother      Tuberculosis Paternal Grandfather      Diabetes Paternal Grandfather      Schizophrenia Paternal Grandmother      Anxiety disorder Paternal Grandmother      Social History     Social History Narrative    She has an older brother and 2 younger brothers.          She was born in [place]         ROS:  Gen: No fever or fatigue  Eyes: No eye discharge.   ENT: Positive for nasal congestion or rhinorrhea. No pharyngitis. No otalgia.  Resp: Positive for cough or wheezing.  GI:No diarrhea, nausea or vomiting  :No dysuria  MS: No joint/bone/muscle tenderness.  Skin: No rashes  Neuro: No headaches  Lymph/Hematologic: No gland swelling      Objective:  Vitals: Pulse 76  Temp 98.2  F (36.8  C) (Oral)   Wt 108 lb 4.8 oz (49.1 kg)  LMP 06/04/2018 (Exact Date)  SpO2 99%  BMI 19.18 kg/m2    Gen: Alert, well appearing  ENT: No nasal congestion or rhinorrhea.  Oropharynx normal, moist mucosa.  TMs normal bilaterally.  Eyes: Conjunctivae clear bilaterally.   Heart: Regular rate and rhythm; normal S1 and S2; no murmurs, gallops, or rubs.  Lungs: Unlabored respirations; clear breath sounds.  O2 sats of 99%  Abdomen: Soft, without organomegaly. Bowel sounds normal. Nontender. No masses palpable. No distention.  Musculoskeletal: Joints with full range-of-motion. Normal upper and lower extremities.  Skin: Normal without lesions.  Neuro: Oriented. Normal reflexes; normal tone; no focal deficits appreciated. Appropriate for age.  Hematologic/Lymph/Immune: No cervical lymphadenopathy  Psychiatric: Appropriate affect      Pertinent results / imaging:  Reviewed     Assessment and Plan:    Stefanie Sinclair is a 17 y.o. female with:    1. URI (upper respiratory infection)    2. Viral illness  I discussed ongoing symptomatic treatment of the viral URI.  I discussed need for increase fluids and sucking on cough drops or lifesavers to remoisturize that irritated throat.  If she develops fever and worsening symptoms or shows no improvement, we should see her back for reevaluation and she agrees with that plan.          Negra ARVIZU  6/12/2018

## 2021-06-19 NOTE — LETTER
Letter by Sharmaine Carrizales MD at      Author: Sharmaine Carrizales MD Service: -- Author Type: --    Filed:  Encounter Date: 7/10/2019 Status: (Other)         Stefanie Sinclair  [address] [facility] [id number]               July 10, 2019      Dear Stefanie:    In addition to helping you feel better when you are sick, we are interested in preventing illness and injury in the first place. In the spirit of maintaining your good health, our record indicates that you are due for your yearly physical along with the following:    Health Maintenance Due   Topic Date Due   ? CHLAMYDIA SCREENING  10/12/2014   ? HPV VACCINES (2 - Female 3-dose series) 03/13/2015   ? ADVANCE DIRECTIVES DISCUSSED WITH PATIENT  10/12/2017     Please call us to make an appointment at your earliest convenience. I look forward to seeing you soon.    Sincerely,         Sharmaine Carrizales MD

## 2021-06-19 NOTE — PROGRESS NOTES
[facility]    ASSESSMENT & PLAN  Stefanie Sinclair is a 17 y.o. who has normal growth and normal development.    Diagnoses and all orders for this visit:    Annual physical exam - vaccines are not up to date - thinks got Hepatitis A vaccine in [place]. Also thinks she had chickenpox, so will check titers. Will come back for Meningococcal and HPV. Will also order Hepatitis A, and she will check with her mom on if we can get records from [place]. Will also order Varicella vaccine in case titers come back showing not immune. Reports had HIV and chlamydia testing within 3 weeks ago at [facility].  -     Vitamin D, Total (25-Hydroxy)  -     Lipid Profile  -     Varicella Zoster Antibody, IgG  -     JIC LAV  - Future vaccines - meningococcal and 2nd HPV, will also Hepatitis A and Varicella to be done if indicated (will check with mom on if Hep A status from [place])    Encounter for medication management in attention deficit hyperactivity disorder (ADHD) - closely monitoring weight  -    Continue Adderall 30 mg daily  - Call when need refill    General counseling for prescription of oral contraceptives  - Urine pregnancy negative  -   levonorgestrel-ethinyl estradiol (AVIANE,ALESSE,LESSINA) 0.1-20 mg-mcg per tablet; Take 1 tablet by mouth daily.  Dispense: 3 Package; Refill: 4    Vitamin D insufficiency  -     Vitamin D, Total (25-Hydroxy)    Follow up for ADHD med check in 6 months. Sooner if needed.    Next physical in 1 year    IMMUNIZATIONS/LABS  Immunizations were reviewed and orders were placed as appropriate.    REFERRALS  Dental:  The patient has already established care with a dentist.  Other:  No additional referrals were made at this time.    ANTICIPATORY GUIDANCE  I have reviewed age appropriate anticipatory guidance.    HEALTH HISTORY  Do you have any concerns that you'd like to discuss today?: No concerns   1)  ADHD - doing well on Adderall 30 mg daily. No refills needed at this  point. It's helping with school and homework. Taking classes over the summer for [employer]. 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 Takes it only when she needs it. It lasts through the afternoon so she can get her work done. No issues with appetite suppression or sleep disruption.    2)  Interested in restarting OCP - stopped for awhile, but no sexually active again. Went to [facility] few weeks ago and reports being tested for HIV, chlamydia and gonorrhea. Also had UPT. All testing negative. Skips hormone-free weeks to avoid bleeding. No concern for pregnancy.     3)  History of insufficient Vitamin D - noted on labs last year. Not on supplement at this point. Cholesterol was also borderline elevated. Fasting currently, so hoping to repeat this.    Roomed by: Whitney    Refills needed? Yes birth control   Do you have any forms that need to be filled out? No        Do you have any significant health concerns in your family history?: No  Family History   Problem Relation Age of Onset     Hypertension Mother      Hypertension Maternal Grandmother      Diabetes Maternal Grandfather      Prostate cancer Maternal Grandfather      ADD / ADHD Father      Alcohol abuse Father      Anxiety disorder Father      ADD / ADHD Brother      Tuberculosis Paternal Grandfather      Diabetes Paternal Grandfather      Schizophrenia Paternal Grandmother      Anxiety disorder Paternal Grandmother      Since your last visit, have there been any major changes in your family, such as a move, job change, separation, divorce, or death in the family?: No  Has a lack of transportation kept you from medical appointments?: No    Home  Who lives in your home?:  Mom and visit dad on weekend  Social History     Social History Narrative    She has an older brother and 2 younger brothers.          She was born in [place]     Do you have any concerns about losing your housing?: No  Is your housing safe and comfortable?: Yes  Do you have any trouble  with sleep?:  No    Education  What school do you child attend?:  [employer]  What grade are you in?:  freshmann in college  How do you perform in school (grades, behavior, attention, homework?: going well     Eating  Do you eat regular meals including fruits and vegetables?:  yes  What are you drinking (cow's milk, water, soda, juice, sports drinks, energy drinks, etc)?: cow's milk- 2% and water  Have you been worried that you don't have enough food?: No  Do you have concerns about your body or appearance?:  No    Activities  Do you have friends?:  yes  Do you get at least one hour of physical activity per day?:  yes  How many hours a day are you in front of a screen other than for schoolwork (computer, TV, phone)?:  2  What do you do for exercise?:  Walking       MENTAL HEALTH SCREENING  PHQ-2 Total Score: 2 (1/18/2018  4:00 PM)  PHQ-9 Total Score: 9 (1/18/2018  4:00 PM)   PHQ-9 Score:  2    VISION/HEARING  Vision: Not done: Performed elsewhere: eye doctor - more than a year ago, contacts  Hearing:  Completed. See Results    No exam data present    TB Risk Assessment:  The patient and/or parent/guardian answer positive to:  patient and/or parent/guardian answer 'no' to all screening TB questions    Dyslipidemia Risk Screening  Have either of your parents or any of your grandparents had a stroke or heart attack before age 55?: No  Any parents with high cholesterol or currently taking medications to treat?: No     Dental  When was the last time you saw the dentist?: over 12 months ago   Parent/Guardian declines the fluoride varnish application today.    Patient Active Problem List   Diagnosis     Attention deficit hyperactivity disorder (ADHD), combined type     Generalized anxiety disorder     Adjustment disorder with depressed mood     Tonsillitis       Drugs  Does the patient use tobacco/alcohol/drugs?:  no    Safety  Does the patient have any safety concerns (peer or home)?:  no  Does the  "patient use safety belts, helmets and other safety equipment?:  yes    Sex  Have you ever had sex?:  Yes    MEASUREMENTS  Height:  5' 3.25\" (1.607 m)  Weight: 107 lb 6.4 oz (48.7 kg)  BMI: Body mass index is 18.87 kg/(m^2).  Blood Pressure: 90/66  Blood pressure percentiles are 3 % systolic and 55 % diastolic based on NHBPEP's 4th Report. Blood pressure percentile targets: 90: 123/79, 95: 127/83, 99 + 5 mmH/95.    PHYSICAL EXAM  GEN: alert, well appearing  EYES: clear  R EAR: canal clear, TM pearly gray  L EAR: canal clear, TM pearly gray  NOSE: clear  OROPHARYNX: clear  NECK: supple, no significant LAD  CVS: RRR, normal S1/S2, no murmur  LUNGS: clear, no increased work of breathing  ABD: soft, non-tender, non-distended  EXT: warm, well perfused, no swelling  MSK: nl muscle bulk, spine straight  NEURO: CN grossly intact, nl strength in UE and LE, nl gait, no dysmetria  SKIN: clear    "

## 2021-06-19 NOTE — LETTER
Letter by Sharmaine Carrizales MD at      Author: Sharmaine Carrizales MD Service: -- Author Type: --    Filed:  Encounter Date: 6/6/2019 Status: (Other)         Stefanie Sinclair  [address] [address]               June 6, 2019      Dear Stefanie:    In addition to helping you feel better when you are sick, we are interested in preventing illness and injury in the first place. In the spirit of maintaining your good health, our record indicates that you are due for your yearly physical along with the following:    Health Maintenance Due   Topic Date Due   ? CHLAMYDIA SCREENING  10/12/2014   ? HPV VACCINES (2 - Female 3-dose series) 03/13/2015   ? ADVANCE DIRECTIVES DISCUSSED WITH PATIENT  10/12/2017     Please call us to make an appointment at your earliest convenience. I look forward to seeing you soon.    Sincerely,         Sharmaine Carrizales MD

## 2021-06-19 NOTE — LETTER
Letter by Sharmaine Carrizales MD at      Author: Sharmaine Carrizales MD Service: -- Author Type: --    Filed:  Encounter Date: 7/10/2019 Status: (Other)         Stefanie Sinclair  [address] [address]               July 10, 2019      Dear Stefanie:    In addition to helping you feel better when you are sick, we are interested in preventing illness and injury in the first place. In the spirit of maintaining your good health, our record indicates that you are due for your yearly physical along with the following:    Health Maintenance Due   Topic Date Due   ? CHLAMYDIA SCREENING  10/12/2014   ? HPV VACCINES (2 - Female 3-dose series) 03/13/2015   ? ADVANCE DIRECTIVES DISCUSSED WITH PATIENT  10/12/2017     Please call us to make an appointment at your earliest convenience. I look forward to seeing you soon.    Sincerely,         Sharmaine Carrizales MD

## 2021-06-20 NOTE — LETTER
Letter by Dae Medina PA-C at      Author: Dae Medina PA-C Service: -- Author Type: --    Filed:  Encounter Date: 1/11/2020 Status: Signed         January 11, 2020     Patient: Stefanie Sinclair   YOB: 2000   Date of Visit: 1/11/2020       To Whom it May Concern:    Stefanie Sinclair was seen in my clinic on 1/11/2020.  Please excuse from work on 1/11/2020 through  11/3/2020.    If you have any questions or concerns, please don't hesitate to call.    Sincerely,         Electronically signed by Dae Medina PA-C

## 2021-06-26 NOTE — PROGRESS NOTES
Assessment:   1. Abdominal pain, generalized  Discussed possible diagnosis and treatment options. Recommend obtaining laboratory studies and following. Meanwhile start diet diary.  - Giardia Detection, Stool; Future  - H. pylori Antigen, Stool(HPSAG); Future  - Ova and Parasite, Stool; Future  - HM2(CBC w/o Differential)  - Basic Metabolic Panel  - Occult Blood(ICT); Future    Plan:   Nonpharmacologic treatments were discussed including: eating smaller meals, elevation of the head of bed at night, avoidance of caffeine, chocolate, nicotine and peppermint, and avoiding tight fitting clothing.  Follow up in 2 weeks or sooner as needed.     Subjective:   Stefanie Sinclair is an 20 y.o. female who presents for evaluation of chronic abdominal symptoms which include nausea, vomiting, and abdominal pain.  Patient reported that she last had severe symptom several months ago when she was continuously throwing up.  She stated her symptoms has been mild since then.  She states she still has off-and-on abdominal pain.  Currently she does not have any pain.  She states she would like to be evaluated to figure out why she keeps having abdominal pain and sometimes she feels nauseous.  She said about 3 weeks ago she was in [place] to see her grandmother and she had abdominal pain and her mother gave her omeprazole and her stomach pain improved.  She denies abdominal bloating, belching, chest pain, choking on food, cough, deep pressure at base of neck, dysphagia, early satiety, fullness after meals, heartburn, hematemesis and hoarseness. Symptoms have been present for 7 months. She denies dysphagia. She has not lost weight. She denies melena, hematochezia, hematemesis, and coffee ground emesis. Medical therapy in the past has included: none.    The following portions of the patient's history were reviewed and updated as appropriate: allergies, current medications, past family history, past medical history, past social history, past  "surgical history and problem list.    Review of Systems  A 12 point comprehensive review of systems was negative except as noted.     Objective:   /58 (Patient Site: Right Arm, Patient Position: Sitting, Cuff Size: Adult Regular)   Pulse 88   Ht 5' 3\" (1.6 m)   Wt 117 lb 4.8 oz (53.2 kg)   LMP 05/17/2021 (Approximate) Comment: Irregular periods.  BMI 20.78 kg/m    General appearance: alert, appears stated age and cooperative  Head: Normocephalic, without obvious abnormality, atraumatic  Throat: lips, mucosa, and tongue normal; teeth and gums normal  Neck: no adenopathy, no carotid bruit, no JVD, supple, symmetrical, trachea midline and thyroid not enlarged, symmetric, no tenderness/mass/nodules  Lungs: clear to auscultation bilaterally  Heart: regular rate and rhythm, S1, S2 normal, no murmur, click, rub or gallop  Pulses: 2+ and symmetric     "

## 2021-07-03 NOTE — ADDENDUM NOTE
Addendum Note by Sharmaine Carrizales MD at 7/12/2018 12:43 PM     Author: Sharmaine Carrizales MD Service: -- Author Type: Physician    Filed: 7/12/2018 12:43 PM Encounter Date: 7/11/2018 Status: Signed    : Sharmaine Carrizales MD (Physician)    Addended by: SHARMAINE CARRIZALES on: 7/12/2018 12:43 PM        Modules accepted: Orders

## 2021-07-06 VITALS
BODY MASS INDEX: 20.79 KG/M2 | HEART RATE: 88 BPM | WEIGHT: 117.3 LBS | DIASTOLIC BLOOD PRESSURE: 58 MMHG | SYSTOLIC BLOOD PRESSURE: 100 MMHG | HEIGHT: 63 IN

## 2021-08-22 ENCOUNTER — HEALTH MAINTENANCE LETTER (OUTPATIENT)
Age: 21
End: 2021-08-22

## 2021-09-14 ENCOUNTER — APPOINTMENT (OUTPATIENT)
Dept: URBAN - METROPOLITAN AREA CLINIC 256 | Age: 21
Setting detail: DERMATOLOGY
End: 2021-09-15

## 2021-09-14 VITALS — WEIGHT: 115 LBS | RESPIRATION RATE: 14 BRPM | HEIGHT: 64 IN

## 2021-09-14 DIAGNOSIS — L65.9 NONSCARRING HAIR LOSS, UNSPECIFIED: ICD-10-CM

## 2021-09-14 DIAGNOSIS — L63.8 OTHER ALOPECIA AREATA: ICD-10-CM

## 2021-09-14 PROCEDURE — OTHER ORDER TESTS: OTHER

## 2021-09-14 PROCEDURE — OTHER PRESCRIPTION MEDICATION MANAGEMENT: OTHER

## 2021-09-14 PROCEDURE — 99202 OFFICE O/P NEW SF 15 MIN: CPT | Mod: 25

## 2021-09-14 PROCEDURE — OTHER COUNSELING: OTHER

## 2021-09-14 PROCEDURE — 11900 INJECT SKIN LESIONS </W 7: CPT

## 2021-09-14 PROCEDURE — OTHER PRESCRIPTION: OTHER

## 2021-09-14 PROCEDURE — OTHER INTRALESIONAL KENALOG: OTHER

## 2021-09-14 RX ORDER — CLOBETASOL PROPIONATE 0.5 MG/ML
SOLUTION TOPICAL
Qty: 50 | Refills: 2 | Status: ERX | COMMUNITY
Start: 2021-09-14

## 2021-09-14 ASSESSMENT — LOCATION ZONE DERM
LOCATION ZONE: FACE
LOCATION ZONE: SCALP

## 2021-09-14 ASSESSMENT — LOCATION DETAILED DESCRIPTION DERM
LOCATION DETAILED: LEFT MEDIAL FRONTAL SCALP
LOCATION DETAILED: RIGHT SUPERIOR FOREHEAD
LOCATION DETAILED: MID-FRONTAL SCALP
LOCATION DETAILED: RIGHT MEDIAL FRONTAL SCALP

## 2021-09-14 ASSESSMENT — LOCATION SIMPLE DESCRIPTION DERM
LOCATION SIMPLE: RIGHT SCALP
LOCATION SIMPLE: RIGHT FOREHEAD
LOCATION SIMPLE: ANTERIOR SCALP
LOCATION SIMPLE: LEFT SCALP

## 2021-09-14 NOTE — PROCEDURE: INTRALESIONAL KENALOG
Validate Note Data When Using Inventory: Yes
Treatment Number (Optional): 1
Detail Level: Simple
Total Volume Injected (Ccs- Only Use Numbers And Decimals): 0.5
Include Z78.9 (Other Specified Conditions Influencing Health Status) As An Associated Diagnosis?: No
Medical Necessity Clause: This procedure was medically necessary because the lesions that were treated were:
Concentration Of Solution Injected (Mg/Ml): 5.0
Kenalog Preparation: Kenalog
Administered By (Optional): Siobhan Wolff PA-C
X Size Of Lesion In Cm (Optional): 0
Consent: The risks of atrophy were reviewed with the patient.

## 2021-10-14 ENCOUNTER — APPOINTMENT (OUTPATIENT)
Dept: URBAN - METROPOLITAN AREA CLINIC 260 | Age: 21
Setting detail: DERMATOLOGY
End: 2021-10-14

## 2021-10-14 DIAGNOSIS — D22 MELANOCYTIC NEVI: ICD-10-CM

## 2021-10-14 DIAGNOSIS — L63.8 OTHER ALOPECIA AREATA: ICD-10-CM

## 2021-10-14 PROBLEM — D22.4 MELANOCYTIC NEVI OF SCALP AND NECK: Status: ACTIVE | Noted: 2021-10-14

## 2021-10-14 PROCEDURE — 99212 OFFICE O/P EST SF 10 MIN: CPT | Mod: 25

## 2021-10-14 PROCEDURE — OTHER PHOTO-DOCUMENTATION: OTHER

## 2021-10-14 PROCEDURE — OTHER INTRALESIONAL KENALOG: OTHER

## 2021-10-14 PROCEDURE — 11900 INJECT SKIN LESIONS </W 7: CPT

## 2021-10-14 PROCEDURE — OTHER COUNSELING: OTHER

## 2021-10-14 ASSESSMENT — LOCATION ZONE DERM
LOCATION ZONE: NECK
LOCATION ZONE: SCALP

## 2021-10-14 ASSESSMENT — LOCATION SIMPLE DESCRIPTION DERM
LOCATION SIMPLE: ANTERIOR SCALP
LOCATION SIMPLE: RIGHT ANTERIOR NECK
LOCATION SIMPLE: RIGHT SCALP

## 2021-10-14 ASSESSMENT — LOCATION DETAILED DESCRIPTION DERM
LOCATION DETAILED: RIGHT CLAVICULAR NECK
LOCATION DETAILED: RIGHT CENTRAL FRONTAL SCALP
LOCATION DETAILED: RIGHT LATERAL FRONTAL SCALP
LOCATION DETAILED: MID-FRONTAL SCALP

## 2021-10-14 NOTE — PROCEDURE: COUNSELING
Detail Level: Zone
Detail Level: Detailed
Sunscreen Recommendations: Will watch and monitor for now and recheck it at nov.

## 2021-10-14 NOTE — PROCEDURE: PHOTO-DOCUMENTATION
Details (Free Text): Recheck when follow up with hairloss.
Detail Level: Detailed
Photo Preface (Leave Blank If You Do Not Want): Photographs were obtained today to monitor

## 2021-10-17 ENCOUNTER — HEALTH MAINTENANCE LETTER (OUTPATIENT)
Age: 21
End: 2021-10-17

## 2021-12-07 ENCOUNTER — APPOINTMENT (OUTPATIENT)
Dept: RADIOLOGY | Facility: CLINIC | Age: 21
End: 2021-12-07
Attending: EMERGENCY MEDICINE
Payer: COMMERCIAL

## 2021-12-07 ENCOUNTER — HOSPITAL ENCOUNTER (EMERGENCY)
Facility: CLINIC | Age: 21
Discharge: HOME | End: 2021-12-07
Attending: EMERGENCY MEDICINE | Admitting: EMERGENCY MEDICINE
Payer: COMMERCIAL

## 2021-12-07 ENCOUNTER — APPOINTMENT (OUTPATIENT)
Dept: CT IMAGING | Facility: CLINIC | Age: 21
End: 2021-12-07
Attending: EMERGENCY MEDICINE
Payer: COMMERCIAL

## 2021-12-07 VITALS
HEART RATE: 113 BPM | RESPIRATION RATE: 16 BRPM | DIASTOLIC BLOOD PRESSURE: 55 MMHG | WEIGHT: 115 LBS | SYSTOLIC BLOOD PRESSURE: 103 MMHG | HEIGHT: 63 IN | BODY MASS INDEX: 20.38 KG/M2 | TEMPERATURE: 100.5 F | OXYGEN SATURATION: 99 %

## 2021-12-07 DIAGNOSIS — J10.1 INFLUENZA A: ICD-10-CM

## 2021-12-07 LAB
ANION GAP SERPL CALCULATED.3IONS-SCNC: 11 MMOL/L (ref 5–18)
BUN SERPL-MCNC: 5 MG/DL (ref 8–22)
CALCIUM SERPL-MCNC: 9.3 MG/DL (ref 8.5–10.5)
CHLORIDE BLD-SCNC: 106 MMOL/L (ref 98–107)
CO2 SERPL-SCNC: 19 MMOL/L (ref 22–31)
CREAT SERPL-MCNC: 0.78 MG/DL (ref 0.6–1.1)
D DIMER PPP FEU-MCNC: 0.53 UG/ML FEU (ref 0–0.5)
FLUAV RNA SPEC QL NAA+PROBE: POSITIVE
FLUBV RNA RESP QL NAA+PROBE: NEGATIVE
GFR SERPL CREATININE-BSD FRML MDRD: >90 ML/MIN/1.73M2
GLUCOSE BLD-MCNC: 115 MG/DL (ref 70–125)
HCG SERPL QL: NEGATIVE
POTASSIUM BLD-SCNC: 3.9 MMOL/L (ref 3.5–5)
SARS-COV-2 RNA RESP QL NAA+PROBE: NEGATIVE
SODIUM SERPL-SCNC: 136 MMOL/L (ref 136–145)

## 2021-12-07 PROCEDURE — 71046 X-RAY EXAM CHEST 2 VIEWS: CPT

## 2021-12-07 PROCEDURE — 87636 SARSCOV2 & INF A&B AMP PRB: CPT | Performed by: EMERGENCY MEDICINE

## 2021-12-07 PROCEDURE — C9803 HOPD COVID-19 SPEC COLLECT: HCPCS

## 2021-12-07 PROCEDURE — 71275 CT ANGIOGRAPHY CHEST: CPT

## 2021-12-07 PROCEDURE — 258N000003 HC RX IP 258 OP 636: Performed by: EMERGENCY MEDICINE

## 2021-12-07 PROCEDURE — 36415 COLL VENOUS BLD VENIPUNCTURE: CPT | Performed by: EMERGENCY MEDICINE

## 2021-12-07 PROCEDURE — 85379 FIBRIN DEGRADATION QUANT: CPT | Performed by: EMERGENCY MEDICINE

## 2021-12-07 PROCEDURE — 250N000011 HC RX IP 250 OP 636: Performed by: EMERGENCY MEDICINE

## 2021-12-07 PROCEDURE — 80048 BASIC METABOLIC PNL TOTAL CA: CPT | Performed by: EMERGENCY MEDICINE

## 2021-12-07 PROCEDURE — 84703 CHORIONIC GONADOTROPIN ASSAY: CPT | Performed by: EMERGENCY MEDICINE

## 2021-12-07 PROCEDURE — 96360 HYDRATION IV INFUSION INIT: CPT | Mod: 59

## 2021-12-07 PROCEDURE — 99285 EMERGENCY DEPT VISIT HI MDM: CPT | Mod: 25

## 2021-12-07 PROCEDURE — 250N000013 HC RX MED GY IP 250 OP 250 PS 637: Performed by: EMERGENCY MEDICINE

## 2021-12-07 RX ORDER — IOPAMIDOL 755 MG/ML
100 INJECTION, SOLUTION INTRAVASCULAR ONCE
Status: COMPLETED | OUTPATIENT
Start: 2021-12-07 | End: 2021-12-07

## 2021-12-07 RX ORDER — IBUPROFEN 600 MG/1
600 TABLET, FILM COATED ORAL ONCE
Status: COMPLETED | OUTPATIENT
Start: 2021-12-07 | End: 2021-12-07

## 2021-12-07 RX ORDER — ACETAMINOPHEN 325 MG/1
650 TABLET ORAL ONCE
Status: COMPLETED | OUTPATIENT
Start: 2021-12-07 | End: 2021-12-07

## 2021-12-07 RX ADMIN — IBUPROFEN 600 MG: 600 TABLET ORAL at 00:58

## 2021-12-07 RX ADMIN — SODIUM CHLORIDE 1000 ML: 9 INJECTION, SOLUTION INTRAVENOUS at 01:04

## 2021-12-07 RX ADMIN — ACETAMINOPHEN 650 MG: 325 TABLET ORAL at 00:58

## 2021-12-07 RX ADMIN — IOPAMIDOL 100 ML: 755 INJECTION, SOLUTION INTRAVENOUS at 01:58

## 2021-12-07 ASSESSMENT — MIFFLIN-ST. JEOR: SCORE: 1250.77

## 2021-12-07 NOTE — Clinical Note
Stefanie Sinclair was seen and treated in our emergency department on 12/7/2021.  She may return to work on 12/13/2021.       If you have any questions or concerns, please don't hesitate to call.      Roberta Allen MD

## 2021-12-07 NOTE — ED PROVIDER NOTES
EMERGENCY DEPARTMENT ENCOUNTER      NAME: Stefanie Sinclair  AGE: 21 year old female  YOB: 2000  MRN: [id number]  EVALUATION DATE & TIME: 12/7/2021 12:36 AM    PCP: Sharmaine Carrizales    ED PROVIDER: Roberta Allen M.D.      Chief Complaint   Patient presents with     Cough     Fever         FINAL IMPRESSION:  1. Influenza A          ED COURSE & MEDICAL DECISION MAKING:    ED Course as of 12/07/21 0234   Tue Dec 07, 2021   0049 Pt here with one day cough and fever and body aches, notes she didn't feel improved with cough & cold syrup, unsure if pregnant thus hcg pending with pt on OCPs at this time, CXR pending with COVID19 PCR and flu A/B, ddimer pending with  and OCP use with possibility of COVID19 + disease, with wells PE 1.5 with tachycardia thus low risk pretest probability for acute PE, therefore ddimer sent and if negative, would have adequate NPV to r/o PE. If ddimer elevated will CTA r/o PE. Patient requests admission for fever initially as she noes once before she had fever to 105 with another viral illness and was then diagnosed with an ear infection, I note we cannot admit to hospital specifically for fever with viral URTI and possible COVID19 disease with sat 97% RA reassuringly but we are more than happy to clinically serially reassess, r/o PE, assess for PNA, test for flu and COVID19 and ensure she feels improved (patient accepts IVF and tylenol/motrin) and she is amenable to that plan at this time. Orders placed with CXR.   0133 Ddimer elevated so CTA r/o PE pending in patient with tachycardia on OCPs although most likely related to anxiety and illness together, understandably. This is also likely etiology of Co2 19 with initial pressed respirations that slowed/normalized in the ED   0153 CXR reassuringly negative, awaiting CTA read (test completed) and repeat VS.   0153 Patient influenza A positive, which explains fever/tachycardia/fever and fatigue/myalgias despite COVID19  vaccination.    0202 Pt clinically reassessed and  improved appearing overall, amenable to plan for dc  with close PMD f/u and for serial reassessments as she  recovers from flu a. Patient discharged after being provided with extensive anticipatory guidance and given return precautions, importance of PMD follow-up emphasized.        Pertinent Labs & Imaging studies reviewed. (See chart for details)        12:40 AM I met with the patient for the initial interview and physical examination. Discussed plan for treatment and workup in the ED. PPE: Provider wore COVID PPE.    At the conclusion of the encounter I discussed the results of all of the tests and the disposition. The questions were answered. The patient or family acknowledged understanding and was agreeable with the care plan.     MEDICATIONS GIVEN IN THE EMERGENCY:  Medications   ibuprofen (ADVIL/MOTRIN) tablet 600 mg (600 mg Oral Given 12/7/21 0058)   0.9% sodium chloride BOLUS (0 mLs Intravenous Stopped 12/7/21 0220)   acetaminophen (TYLENOL) tablet 650 mg (650 mg Oral Given 12/7/21 0058)   iopamidol (ISOVUE-370) solution 100 mL (100 mLs Intravenous Given 12/7/21 0158)       NEW PRESCRIPTIONS STARTED AT TODAY'S ER VISIT  New Prescriptions    No medications on file          =================================================================    HPI    Stefanie Sinclair is a 21 year old female with PMHx of ADHD, anxiety disorder, and taking estrogen-containing birth control who presents to the ED today for evaluation of cough, fever, and myalgias.     Patient reports 1 day of cough, subjective fever, mild myalgias, and the sense that she may be wheezing, although no history of asthma. Took cough and cold syrup without relief, so presented to the ED for assessment. Notes that she has several friends with similar symptoms, although none of them have been tested for Covid-19 to her knowledge. Fully vaccinated for Covid-19. She is unsure if she is pregnant at this  time. Patient does not have any other associated complaints or concerns at this time.       REVIEW OF SYSTEMS   All other systems reviewed and are negative except as noted above in HPI.    PAST MEDICAL HISTORY:  Past Medical History:   Diagnosis Date     Chicken pox      History of sprained elbow 2012    left     Unspecified viral infection, in conditions classified elsewhere and of unspecified site     Created by Conversion        PAST SURGICAL HISTORY:  Past Surgical History:   Procedure Laterality Date     TONSILLECTOMY & ADENOIDECTOMY Bilateral 06/2017       CURRENT MEDICATIONS:    acetaminophen (TYLENOL) 325 MG tablet  dextroamphetamine-amphetamine (ADDERALL) 15 mg Tab  ibuprofen (ADVIL,MOTRIN) 200 MG tablet  levonorgestrel-ethinyl estradiol (AVIANE,ALESSE,LESSINA) 0.1-20 mg-mcg per tablet        ALLERGIES:  No Known Allergies    FAMILY HISTORY:  Family History   Problem Relation Age of Onset     Hypertension Mother      Hypertension Maternal Grandmother      Diabetes Maternal Grandfather      Prostate Cancer Maternal Grandfather      Attention Deficit Disorder Father      Alcoholism Father      Anxiety Disorder Father      Attention Deficit Disorder Brother      Tuberculosis Paternal Grandfather      Diabetes Paternal Grandfather      Schizophrenia Paternal Grandmother      Anxiety Disorder Paternal Grandmother        SOCIAL HISTORY:   Social History     Socioeconomic History     Marital status: Single     Spouse name: None     Number of children: None     Years of education: None     Highest education level: None   Occupational History     None   Tobacco Use     Smoking status: Current Some Day Smoker     Smokeless tobacco: Never Used     Tobacco comment: Vaping   Substance and Sexual Activity     Alcohol use: None     Drug use: None     Sexual activity: None   Other Topics Concern     None   Social History Narrative    She has an older brother and 2 younger brothers.      She was born in [place]     Social  "Determinants of Health     Financial Resource Strain: Not on file   Food Insecurity: Not on file   Transportation Needs: Not on file   Physical Activity: Not on file   Stress: Not on file   Social Connections: Not on file   Intimate Partner Violence: Not on file   Housing Stability: Not on file       VITALS:  Patient Vitals for the past 24 hrs:   BP Temp Temp src Pulse Resp SpO2 Height Weight   12/07/21 0159 135/59 -- -- (!) 121 -- 95 % -- --   12/07/21 0122 116/58 -- -- 114 -- 99 % -- --   12/07/21 0029 100/71 (!) 100.5  F (38.1  C) Oral 117 16 97 % 1.6 m (5' 3\") 52.2 kg (115 lb)       PHYSICAL EXAM    GENERAL: Awake, alert.  In no acute distress.   HEENT: Normocephalic, atraumatic.  Pupils equal, round and reactive.  Conjunctiva normal.  EOMI.  NECK: No stridor or apparent deformity.  PULMONARY: Symmetrical breath sounds without distress.  Lungs clear to auscultation bilaterally without wheezes, rhonchi or rales.  CARDIO: Regular rate and rhythm.  No significant murmur, rub or gallop.  Radial pulses strong and symmetrical.  ABDOMINAL: Abdomen soft, non-distended and non-tender to palpation.  No CVAT, no palpable hepatosplenomegaly.  EXTREMITIES: No lower extremity swelling or edema.    NEURO: Alert and oriented to person, place and time.  Cranial nerves grossly intact.  No focal motor deficit.  PSYCH: Normal mood and affect  SKIN: No rashes      LAB:  All pertinent labs reviewed and interpreted.  Results for orders placed or performed during the hospital encounter of 12/07/21   XR Chest 2 Views    Impression    IMPRESSION: Negative chest.   CT Chest Pulmonary Embolism w Contrast    Impression    IMPRESSION:   1. No visualized pulmonary embolus.  2. No evidence of active pulmonary disease.    Basic metabolic panel   Result Value Ref Range    Sodium 136 136 - 145 mmol/L    Potassium 3.9 3.5 - 5.0 mmol/L    Chloride 106 98 - 107 mmol/L    Carbon Dioxide (CO2) 19 (L) 22 - 31 mmol/L    Anion Gap 11 5 - 18 mmol/L    " Urea Nitrogen 5 (L) 8 - 22 mg/dL    Creatinine 0.78 0.60 - 1.10 mg/dL    Calcium 9.3 8.5 - 10.5 mg/dL    Glucose 115 70 - 125 mg/dL    GFR Estimate >90 >60 mL/min/1.73m2   HCG qualitative Blood   Result Value Ref Range    hCG Serum Qualitative Negative Negative   D dimer quantitative   Result Value Ref Range    D-Dimer Quantitative 0.53 (H) 0.00 - 0.50 ug/mL FEU   Symptomatic Influenza A/B & SARS-CoV2 (COVID-19) Virus PCR Multiplex Nasopharyngeal    Specimen: Nasopharyngeal; Swab   Result Value Ref Range    Influenza A PCR Positive (A) Negative    Influenza B PCR Negative Negative    SARS CoV2 PCR Negative Negative       RADIOLOGY:  Reviewed all pertinent imaging. Please see official radiology report.  CT Chest Pulmonary Embolism w Contrast   Final Result   IMPRESSION:    1. No visualized pulmonary embolus.   2. No evidence of active pulmonary disease.       XR Chest 2 Views   Final Result   IMPRESSION: Negative chest.                    I, Shawna Aponte, am serving as a scribe to document services personally performed by Dr. Roberta Allen based on my observation and the provider's statements to me. I, Roberta Allen MD attest that Shawna Aponte is acting in a scribe capacity, has observed my performance of the services and has documented them in accordance with my direction.     Roberta Allen MD  12/07/21 [id number]

## 2022-07-05 ENCOUNTER — APPOINTMENT (OUTPATIENT)
Dept: RADIOLOGY | Facility: CLINIC | Age: 22
End: 2022-07-05
Attending: EMERGENCY MEDICINE
Payer: COMMERCIAL

## 2022-07-05 ENCOUNTER — ANCILLARY PROCEDURE (OUTPATIENT)
Dept: ULTRASOUND IMAGING | Facility: CLINIC | Age: 22
End: 2022-07-05
Attending: EMERGENCY MEDICINE
Payer: COMMERCIAL

## 2022-07-05 ENCOUNTER — APPOINTMENT (OUTPATIENT)
Dept: CT IMAGING | Facility: CLINIC | Age: 22
End: 2022-07-05
Attending: EMERGENCY MEDICINE
Payer: COMMERCIAL

## 2022-07-05 ENCOUNTER — HOSPITAL ENCOUNTER (EMERGENCY)
Facility: CLINIC | Age: 22
Discharge: HOME | End: 2022-07-05
Attending: EMERGENCY MEDICINE | Admitting: EMERGENCY MEDICINE
Payer: COMMERCIAL

## 2022-07-05 VITALS
HEART RATE: 65 BPM | DIASTOLIC BLOOD PRESSURE: 69 MMHG | HEIGHT: 63 IN | SYSTOLIC BLOOD PRESSURE: 113 MMHG | WEIGHT: 120 LBS | TEMPERATURE: 98.8 F | RESPIRATION RATE: 16 BRPM | OXYGEN SATURATION: 97 % | BODY MASS INDEX: 21.26 KG/M2

## 2022-07-05 DIAGNOSIS — T14.8XXA ABRASION: ICD-10-CM
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
## 2022-07-05 DIAGNOSIS — T30.0 BURN, FIRST DEGREE: ICD-10-CM

## 2022-07-05 LAB
ALBUMIN SERPL-MCNC: 4.2 G/DL (ref 3.5–5)
ALP SERPL-CCNC: 60 U/L (ref 45–120)
ALT SERPL W P-5'-P-CCNC: 12 U/L (ref 0–45)
ANION GAP SERPL CALCULATED.3IONS-SCNC: 8 MMOL/L (ref 5–18)
APTT PPP: 29 SECONDS (ref 22–38)
AST SERPL W P-5'-P-CCNC: 26 U/L (ref 0–40)
BASOPHILS # BLD AUTO: 0.1 10E3/UL (ref 0–0.2)
BASOPHILS NFR BLD AUTO: 1 %
BILIRUB SERPL-MCNC: 0.4 MG/DL (ref 0–1)
BUN SERPL-MCNC: 8 MG/DL (ref 8–22)
CALCIUM SERPL-MCNC: 9.4 MG/DL (ref 8.5–10.5)
CHLORIDE BLD-SCNC: 105 MMOL/L (ref 98–107)
CO2 SERPL-SCNC: 24 MMOL/L (ref 22–31)
CREAT SERPL-MCNC: 0.72 MG/DL (ref 0.6–1.1)
EOSINOPHIL # BLD AUTO: 0.2 10E3/UL (ref 0–0.7)
EOSINOPHIL NFR BLD AUTO: 1 %
ERYTHROCYTE [DISTWIDTH] IN BLOOD BY AUTOMATED COUNT: 13.2 % (ref 10–15)
ETHANOL SERPL-MCNC: <10 MG/DL
GFR SERPL CREATININE-BSD FRML MDRD: >90 ML/MIN/1.73M2
GLUCOSE BLD-MCNC: 82 MG/DL (ref 70–125)
HCG SERPL QL: NEGATIVE
HCT VFR BLD AUTO: 39.3 % (ref 35–47)
HGB BLD-MCNC: 13.2 G/DL (ref 11.7–15.7)
IMM GRANULOCYTES # BLD: 0 10E3/UL
IMM GRANULOCYTES NFR BLD: 0 %
INR PPP: 1.01 (ref 0.85–1.15)
LYMPHOCYTES # BLD AUTO: 1.7 10E3/UL (ref 0.8–5.3)
LYMPHOCYTES NFR BLD AUTO: 15 %
MCH RBC QN AUTO: 29.3 PG (ref 26.5–33)
MCHC RBC AUTO-ENTMCNC: 33.6 G/DL (ref 31.5–36.5)
MCV RBC AUTO: 87 FL (ref 78–100)
MONOCYTES # BLD AUTO: 1 10E3/UL (ref 0–1.3)
MONOCYTES NFR BLD AUTO: 9 %
NEUTROPHILS # BLD AUTO: 8.8 10E3/UL (ref 1.6–8.3)
NEUTROPHILS NFR BLD AUTO: 74 %
NRBC # BLD AUTO: 0 10E3/UL
NRBC BLD AUTO-RTO: 0 /100
PLATELET # BLD AUTO: 292 10E3/UL (ref 150–450)
POTASSIUM BLD-SCNC: 3.9 MMOL/L (ref 3.5–5)
PROT SERPL-MCNC: 7.1 G/DL (ref 6–8)
RBC # BLD AUTO: 4.5 10E6/UL (ref 3.8–5.2)
SODIUM SERPL-SCNC: 137 MMOL/L (ref 136–145)
WBC # BLD AUTO: 11.9 10E3/UL (ref 4–11)

## 2022-07-05 PROCEDURE — 82077 ASSAY SPEC XCP UR&BREATH IA: CPT | Performed by: EMERGENCY MEDICINE

## 2022-07-05 PROCEDURE — 93005 ELECTROCARDIOGRAM TRACING: CPT | Performed by: EMERGENCY MEDICINE

## 2022-07-05 PROCEDURE — 96374 THER/PROPH/DIAG INJ IV PUSH: CPT | Mod: 59

## 2022-07-05 PROCEDURE — 74177 CT ABD & PELVIS W/CONTRAST: CPT

## 2022-07-05 PROCEDURE — 85610 PROTHROMBIN TIME: CPT | Performed by: EMERGENCY MEDICINE

## 2022-07-05 PROCEDURE — 250N000009 HC RX 250: Performed by: EMERGENCY MEDICINE

## 2022-07-05 PROCEDURE — 96375 TX/PRO/DX INJ NEW DRUG ADDON: CPT

## 2022-07-05 PROCEDURE — 90715 TDAP VACCINE 7 YRS/> IM: CPT | Performed by: EMERGENCY MEDICINE

## 2022-07-05 PROCEDURE — 71045 X-RAY EXAM CHEST 1 VIEW: CPT

## 2022-07-05 PROCEDURE — 72072 X-RAY EXAM THORAC SPINE 3VWS: CPT

## 2022-07-05 PROCEDURE — 90471 IMMUNIZATION ADMIN: CPT | Performed by: EMERGENCY MEDICINE

## 2022-07-05 PROCEDURE — 250N000011 HC RX IP 250 OP 636: Performed by: EMERGENCY MEDICINE

## 2022-07-05 PROCEDURE — 96361 HYDRATE IV INFUSION ADD-ON: CPT

## 2022-07-05 PROCEDURE — 250N000013 HC RX MED GY IP 250 OP 250 PS 637: Performed by: EMERGENCY MEDICINE

## 2022-07-05 PROCEDURE — 85730 THROMBOPLASTIN TIME PARTIAL: CPT | Performed by: EMERGENCY MEDICINE

## 2022-07-05 PROCEDURE — 36415 COLL VENOUS BLD VENIPUNCTURE: CPT | Performed by: EMERGENCY MEDICINE

## 2022-07-05 PROCEDURE — 85025 COMPLETE CBC W/AUTO DIFF WBC: CPT | Performed by: EMERGENCY MEDICINE

## 2022-07-05 PROCEDURE — 76705 ECHO EXAM OF ABDOMEN: CPT

## 2022-07-05 PROCEDURE — 72100 X-RAY EXAM L-S SPINE 2/3 VWS: CPT

## 2022-07-05 PROCEDURE — 80053 COMPREHEN METABOLIC PANEL: CPT | Performed by: EMERGENCY MEDICINE

## 2022-07-05 PROCEDURE — 99285 EMERGENCY DEPT VISIT HI MDM: CPT | Mod: 25

## 2022-07-05 PROCEDURE — 258N000003 HC RX IP 258 OP 636: Performed by: EMERGENCY MEDICINE

## 2022-07-05 PROCEDURE — 84703 CHORIONIC GONADOTROPIN ASSAY: CPT | Performed by: EMERGENCY MEDICINE

## 2022-07-05 RX ORDER — IBUPROFEN 600 MG/1
600 TABLET, FILM COATED ORAL ONCE
Status: COMPLETED | OUTPATIENT
Start: 2022-07-05 | End: 2022-07-05

## 2022-07-05 RX ORDER — IOPAMIDOL 755 MG/ML
100 INJECTION, SOLUTION INTRAVASCULAR ONCE
Status: COMPLETED | OUTPATIENT
Start: 2022-07-05 | End: 2022-07-05

## 2022-07-05 RX ORDER — IBUPROFEN 800 MG/1
800 TABLET, FILM COATED ORAL EVERY 8 HOURS PRN
Qty: 24 TABLET | Refills: 0 | Status: SHIPPED | OUTPATIENT
Start: 2022-07-05 | End: 2022-07-13

## 2022-07-05 RX ORDER — ONDANSETRON 2 MG/ML
4 INJECTION INTRAMUSCULAR; INTRAVENOUS ONCE
Status: COMPLETED | OUTPATIENT
Start: 2022-07-05 | End: 2022-07-05

## 2022-07-05 RX ORDER — KETOROLAC TROMETHAMINE 15 MG/ML
15 INJECTION, SOLUTION INTRAMUSCULAR; INTRAVENOUS ONCE
Status: DISCONTINUED | OUTPATIENT
Start: 2022-07-05 | End: 2022-07-06 | Stop reason: HOSPADM

## 2022-07-05 RX ORDER — GINSENG 100 MG
CAPSULE ORAL ONCE
Status: COMPLETED | OUTPATIENT
Start: 2022-07-05 | End: 2022-07-05

## 2022-07-05 RX ORDER — MORPHINE SULFATE 4 MG/ML
4 INJECTION, SOLUTION INTRAMUSCULAR; INTRAVENOUS ONCE
Status: COMPLETED | OUTPATIENT
Start: 2022-07-05 | End: 2022-07-05

## 2022-07-05 RX ORDER — ACETAMINOPHEN 325 MG/1
650 TABLET ORAL ONCE
Status: CANCELLED | OUTPATIENT
Start: 2022-07-05 | End: 2022-07-05

## 2022-07-05 RX ADMIN — IBUPROFEN 600 MG: 600 TABLET ORAL at 20:06

## 2022-07-05 RX ADMIN — MORPHINE SULFATE 4 MG: 4 INJECTION, SOLUTION INTRAMUSCULAR; INTRAVENOUS at 21:12

## 2022-07-05 RX ADMIN — CLOSTRIDIUM TETANI TOXOID ANTIGEN (FORMALDEHYDE INACTIVATED), CORYNEBACTERIUM DIPHTHERIAE TOXOID ANTIGEN (FORMALDEHYDE INACTIVATED), BORDETELLA PERTUSSIS TOXOID ANTIGEN (GLUTARALDEHYDE INACTIVATED), BORDETELLA PERTUSSIS FILAMENTOUS HEMAGGLUTININ ANTIGEN (FORMALDEHYDE INACTIVATED), BORDETELLA PERTUSSIS PERTACTIN ANTIGEN, AND BORDETELLA PERTUSSIS FIMBRIAE 2/3 ANTIGEN 0.5 ML: 5; 2; 2.5; 5; 3; 5 INJECTION, SUSPENSION INTRAMUSCULAR at 21:14

## 2022-07-05 RX ADMIN — ONDANSETRON 4 MG: 2 INJECTION INTRAMUSCULAR; INTRAVENOUS at 21:11

## 2022-07-05 RX ADMIN — IOPAMIDOL 100 ML: 755 INJECTION, SOLUTION INTRAVENOUS at 20:44

## 2022-07-05 RX ADMIN — BACITRACIN 1 PACKET: 500 OINTMENT TOPICAL at 21:20

## 2022-07-05 RX ADMIN — SODIUM CHLORIDE 1000 ML: 9 INJECTION, SOLUTION INTRAVENOUS at 21:10

## 2022-07-05 NOTE — Clinical Note
Stefanie Sinclair was seen and treated in our emergency department on 7/5/2022.  She may return to work on 07/07/2022.       If you have any questions or concerns, please don't hesitate to call.      Roberta Allen MD

## 2022-07-06 LAB
ATRIAL RATE - MUSE: 74 BPM
DIASTOLIC BLOOD PRESSURE - MUSE: NORMAL MMHG
INTERPRETATION ECG - MUSE: NORMAL
P AXIS - MUSE: 50 DEGREES
PR INTERVAL - MUSE: 152 MS
QRS DURATION - MUSE: 78 MS
QT - MUSE: 392 MS
QTC - MUSE: 435 MS
R AXIS - MUSE: 66 DEGREES
SYSTOLIC BLOOD PRESSURE - MUSE: NORMAL MMHG
T AXIS - MUSE: 37 DEGREES
VENTRICULAR RATE- MUSE: 74 BPM
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
## 2022-07-06 NOTE — ED NOTES
Bed: [facility]-04  Expected date: 7/5/22  Expected time: 8:06 PM  Means of arrival: Other  Comments:  Surge 1
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
## 2022-07-21 ENCOUNTER — TRANSFERRED RECORDS (OUTPATIENT)
Dept: HEALTH INFORMATION MANAGEMENT | Facility: CLINIC | Age: 22
End: 2022-07-21

## 2022-08-01 ENCOUNTER — TRANSFERRED RECORDS (OUTPATIENT)
Dept: HEALTH INFORMATION MANAGEMENT | Facility: CLINIC | Age: 22
End: 2022-08-01

## 2022-08-11 ENCOUNTER — TRANSFERRED RECORDS (OUTPATIENT)
Dept: HEALTH INFORMATION MANAGEMENT | Facility: CLINIC | Age: 22
End: 2022-08-11

## 2022-09-05 ENCOUNTER — HOSPITAL ENCOUNTER (EMERGENCY)
Facility: CLINIC | Age: 22
Discharge: HOME | End: 2022-09-05
Attending: STUDENT IN AN ORGANIZED HEALTH CARE EDUCATION/TRAINING PROGRAM | Admitting: STUDENT IN AN ORGANIZED HEALTH CARE EDUCATION/TRAINING PROGRAM
Payer: COMMERCIAL

## 2022-09-05 ENCOUNTER — APPOINTMENT (OUTPATIENT)
Dept: MRI IMAGING | Facility: CLINIC | Age: 22
End: 2022-09-05
Attending: STUDENT IN AN ORGANIZED HEALTH CARE EDUCATION/TRAINING PROGRAM
Payer: COMMERCIAL

## 2022-09-05 VITALS
OXYGEN SATURATION: 98 % | SYSTOLIC BLOOD PRESSURE: 136 MMHG | RESPIRATION RATE: 16 BRPM | HEART RATE: 108 BPM | TEMPERATURE: 98.4 F | DIASTOLIC BLOOD PRESSURE: 85 MMHG

## 2022-09-05 DIAGNOSIS — S19.9XXA INJURY OF NECK, INITIAL ENCOUNTER: ICD-10-CM

## 2022-09-05 PROCEDURE — 72141 MRI NECK SPINE W/O DYE: CPT

## 2022-09-05 PROCEDURE — 99284 EMERGENCY DEPT VISIT MOD MDM: CPT | Mod: 25

## 2022-09-05 ASSESSMENT — ENCOUNTER SYMPTOMS
NECK PAIN: 1
NUMBNESS: 1
WEAKNESS: 1

## 2022-09-05 ASSESSMENT — ACTIVITIES OF DAILY LIVING (ADL)
ADLS_ACUITY_SCORE: 35
ADLS_ACUITY_SCORE: 33
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
## 2022-09-05 NOTE — Clinical Note
Yahir was seen and treated in our emergency department on 9/5/2022.  She may return to school on 09/09/2022.      If you have any questions or concerns, please don't hesitate to call.      Ronal Wagner MD
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
## 2022-09-06 NOTE — DISCHARGE INSTRUCTIONS
Please keep collar in place.  You should return immediately if you experience any new numbness or tingling or any other issues.

## 2022-09-19 ENCOUNTER — MEDICAL CORRESPONDENCE (OUTPATIENT)
Dept: HEALTH INFORMATION MANAGEMENT | Facility: CLINIC | Age: 22
End: 2022-09-19

## 2022-09-19 ENCOUNTER — TRANSFERRED RECORDS (OUTPATIENT)
Dept: HEALTH INFORMATION MANAGEMENT | Facility: CLINIC | Age: 22
End: 2022-09-19

## 2022-09-26 ENCOUNTER — TRANSCRIBE ORDERS (OUTPATIENT)
Dept: OTHER | Age: 22
End: 2022-09-26

## 2022-09-26 DIAGNOSIS — M54.2 NECK PAIN: ICD-10-CM

## 2022-09-26 DIAGNOSIS — S14.109A ACUTE TRAUMATIC INJURY OF CERVICAL SPINE (H): Primary | ICD-10-CM

## 2022-10-01 ENCOUNTER — HEALTH MAINTENANCE LETTER (OUTPATIENT)
Age: 22
End: 2022-10-01

## 2022-10-10 ENCOUNTER — OFFICE VISIT (OUTPATIENT)
Dept: PHYSICAL MEDICINE AND REHAB | Facility: CLINIC | Age: 22
End: 2022-10-10
Attending: NEUROLOGICAL SURGERY
Payer: COMMERCIAL

## 2022-10-10 VITALS
WEIGHT: 150 LBS | SYSTOLIC BLOOD PRESSURE: 116 MMHG | HEIGHT: 64 IN | BODY MASS INDEX: 25.61 KG/M2 | DIASTOLIC BLOOD PRESSURE: 67 MMHG | HEART RATE: 83 BPM

## 2022-10-10 DIAGNOSIS — F32.A DEPRESSION, UNSPECIFIED DEPRESSION TYPE: ICD-10-CM

## 2022-10-10 DIAGNOSIS — M54.2 NECK PAIN: ICD-10-CM

## 2022-10-10 DIAGNOSIS — S13.4XXS INJURY TO LIGAMENT OF CERVICAL SPINE, SEQUELA: Primary | ICD-10-CM

## 2022-10-10 DIAGNOSIS — S14.109A ACUTE TRAUMATIC INJURY OF CERVICAL SPINE (H): ICD-10-CM

## 2022-10-10 PROCEDURE — 99204 OFFICE O/P NEW MOD 45 MIN: CPT | Performed by: PHYSICAL MEDICINE & REHABILITATION

## 2022-10-10 RX ORDER — NAPROXEN 500 MG/1
500 TABLET ORAL 2 TIMES DAILY PRN
Qty: 60 TABLET | Refills: 1 | Status: SHIPPED | OUTPATIENT
Start: 2022-10-10 | End: 2023-01-06

## 2022-10-10 ASSESSMENT — PAIN SCALES - GENERAL: PAINLEVEL: MODERATE PAIN (4)

## 2022-10-10 ASSESSMENT — PATIENT HEALTH QUESTIONNAIRE - PHQ9: SUM OF ALL RESPONSES TO PHQ QUESTIONS 1-9: 17
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
## 2022-10-10 NOTE — PROGRESS NOTES
Depression Response    Patient completed the PHQ-9 assessment for depression and scored >9? Yes  Question 9 on the PHQ-9 was positive for suicidality? No  Does patient have current mental health provider? No    Is this a virtual visit? No    I personally notified the following: visit provider
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
## 2022-10-10 NOTE — PATIENT INSTRUCTIONS
Naproxen (which is an anti-inflammatory) medication is prescribed today. Take 1 tablet 2 times a day as needed for pain.This medication should be taken with food and water to prevent any stomach upset. Do not take ibuprofen/Advil/Motrin/Aleve  while you take Naproxen. Please call if you have any side effects.   Neurosurgery evaluation  Orthotics eval to discuss/fix the brace  Behavioral health eval to discuss depression

## 2022-11-04 ENCOUNTER — TELEPHONE (OUTPATIENT)
Dept: NEUROSURGERY | Facility: CLINIC | Age: 22
End: 2022-11-04

## 2022-11-04 NOTE — TELEPHONE ENCOUNTER
11/4/22 Patient states that she in the parking lot of clinic and would to be seen today.  I explained that there was no time avail today.  Asked her if he would like to dillan.   She stated no and wanted to transferred to a nurse.  However, the nurse was unavail at the time.  She stated that she would like a call back asap.  Best contact is [phone]

## 2022-11-16 ENCOUNTER — OFFICE VISIT (OUTPATIENT)
Dept: NEUROSURGERY | Facility: CLINIC | Age: 22
End: 2022-11-16
Attending: PHYSICAL MEDICINE & REHABILITATION
Payer: COMMERCIAL

## 2022-11-16 VITALS
DIASTOLIC BLOOD PRESSURE: 72 MMHG | HEART RATE: 83 BPM | HEIGHT: 64 IN | OXYGEN SATURATION: 98 % | SYSTOLIC BLOOD PRESSURE: 105 MMHG | BODY MASS INDEX: 25.61 KG/M2 | WEIGHT: 150 LBS

## 2022-11-16 DIAGNOSIS — S13.120S ATLANTOAXIAL SUBLUXATION, SEQUELA: Primary | ICD-10-CM

## 2022-11-16 PROCEDURE — 99214 OFFICE O/P EST MOD 30 MIN: CPT | Performed by: SURGERY
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
## 2022-11-16 NOTE — NURSING NOTE
Pt is here for follow up on imaging. Pt states that she no longer has neck pain but mild shoulder pain. Pt also states that her neck is weak and her head feels heavy.   NDI: 48%  George Rodas MA
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
## 2022-11-18 ENCOUNTER — TELEPHONE (OUTPATIENT)
Dept: NEUROSURGERY | Facility: CLINIC | Age: 22
End: 2022-11-18

## 2022-11-18 NOTE — TELEPHONE ENCOUNTER
Called Elvira and let her know that Dr. Christensen reviewed that cervical xray, done at [facility] on 11/7/2022, the ligaments are stretchy but intact. Okay to wean out neck brace - instructions were provided. Elvira will follow up on PRN basis.  She verbalized satisfaction.  Shira Rubio RN, CNRN

## 2023-01-06 ENCOUNTER — VIRTUAL VISIT (OUTPATIENT)
Dept: FAMILY MEDICINE | Facility: CLINIC | Age: 23
End: 2023-01-06
Payer: COMMERCIAL

## 2023-01-06 DIAGNOSIS — F41.1 GENERALIZED ANXIETY DISORDER: ICD-10-CM

## 2023-01-06 DIAGNOSIS — F43.21 ADJUSTMENT DISORDER WITH DEPRESSED MOOD: Primary | ICD-10-CM

## 2023-01-06 PROCEDURE — 99213 OFFICE O/P EST LOW 20 MIN: CPT | Mod: 95 | Performed by: NURSE PRACTITIONER

## 2023-01-06 ASSESSMENT — ANXIETY QUESTIONNAIRES
GAD7 TOTAL SCORE: 14
6. BECOMING EASILY ANNOYED OR IRRITABLE: MORE THAN HALF THE DAYS
2. NOT BEING ABLE TO STOP OR CONTROL WORRYING: MORE THAN HALF THE DAYS
4. TROUBLE RELAXING: MORE THAN HALF THE DAYS
GAD7 TOTAL SCORE: 14
8. IF YOU CHECKED OFF ANY PROBLEMS, HOW DIFFICULT HAVE THESE MADE IT FOR YOU TO DO YOUR WORK, TAKE CARE OF THINGS AT HOME, OR GET ALONG WITH OTHER PEOPLE?: SOMEWHAT DIFFICULT
3. WORRYING TOO MUCH ABOUT DIFFERENT THINGS: MORE THAN HALF THE DAYS
GAD7 TOTAL SCORE: 14
IF YOU CHECKED OFF ANY PROBLEMS ON THIS QUESTIONNAIRE, HOW DIFFICULT HAVE THESE PROBLEMS MADE IT FOR YOU TO DO YOUR WORK, TAKE CARE OF THINGS AT HOME, OR GET ALONG WITH OTHER PEOPLE: SOMEWHAT DIFFICULT
7. FEELING AFRAID AS IF SOMETHING AWFUL MIGHT HAPPEN: MORE THAN HALF THE DAYS
7. FEELING AFRAID AS IF SOMETHING AWFUL MIGHT HAPPEN: MORE THAN HALF THE DAYS
5. BEING SO RESTLESS THAT IT IS HARD TO SIT STILL: MORE THAN HALF THE DAYS
1. FEELING NERVOUS, ANXIOUS, OR ON EDGE: MORE THAN HALF THE DAYS

## 2023-01-06 ASSESSMENT — PATIENT HEALTH QUESTIONNAIRE - PHQ9
10. IF YOU CHECKED OFF ANY PROBLEMS, HOW DIFFICULT HAVE THESE PROBLEMS MADE IT FOR YOU TO DO YOUR WORK, TAKE CARE OF THINGS AT HOME, OR GET ALONG WITH OTHER PEOPLE: VERY DIFFICULT
SUM OF ALL RESPONSES TO PHQ QUESTIONS 1-9: 14
SUM OF ALL RESPONSES TO PHQ QUESTIONS 1-9: 14

## 2023-01-06 NOTE — LETTER
January 6, 2023      Stefanie Sinclair  [address] [address]        To Whom It May Concern:    Stefanie Sinclair was seen in our clinic. It is felt to be in her best interest to be allowed to have her male cat she has had for 2.5 years to live with her in her apartment for ongoing support of her mental needs.       Sincerely,          HYACINTH Shell CNP

## 2023-01-06 NOTE — PROGRESS NOTES
"Stefanie is a 22 year old who is being evaluated via a billable video visit.      How would you like to obtain your AVS? MyChart  If the video visit is dropped, the invitation should be resent by: Text to cell phone: [phone]  Will anyone else be joining your video visit? No    Assessment & Plan     Adjustment disorder with depressed mood  Generalized anxiety disorder  Doing well no red flag symptoms no thoughts of self-harm.  Previously had a PCP that was a pediatrician and needs new PCP in her area.  Has counseling scheduled for 2 weeks from now.    Letter provided for her to have her cat in her new apartment for an emotional support animal.  Recommend she follow-up with a new PCP/establish care and recheck her mood in the next 4 weeks.  Crisis information provided.  Stefanie verbalizes understanding of plan of care and is in agreement.     Nicotine/Tobacco Cessation:  She reports that she has been smoking. She has never used smokeless tobacco.  Nicotine/Tobacco Cessation Plan:   Self help information given to patient    Cat male 2.5 years old.   Apartment she will be moving into does not alow animal.   Counseling in 2 weeks.  Not on any meds.  No thoughts of self harm.       BMI:   Estimated body mass index is 25.75 kg/m  as calculated from the following:    Height as of 11/16/22: 1.626 m (5' 4\").    Weight as of 11/16/22: 68 kg (150 lb).     Depression Screening Follow Up    PHQ 1/6/2023   PHQ-9 Total Score 14   Q9: Thoughts of better off dead/self-harm past 2 weeks Not at all     Return in about 4 weeks (around 2/3/2023) for establish care mood check with new PCP.      HYACINTH Shell [facility]   Stefanie is a 22 year old, presenting for the following health issues:  Animal Support Letter      History of Present Illness       Mental Health Follow-up:  Patient presents to follow-up on Depression & Anxiety.Patient's depression since last visit has been:  Medium  The " patient is having other symptoms associated with depression.  Patient's anxiety since last visit has been:  Medium  The patient is having other symptoms associated with anxiety.  Any significant life events: job concerns and health concerns  Patient is feeling anxious or having panic attacks.  Patient has no concerns about alcohol or drug use.    She eats 2-3 servings of fruits and vegetables daily.She consumes 0 sweetened beverage(s) daily.She exercises with enough effort to increase her heart rate 20 to 29 minutes per day.  She exercises with enough effort to increase her heart rate 4 days per week.   She is taking medications regularly.    Today's PHQ-9         PHQ-9 Total Score: 14    PHQ-9 Q9 Thoughts of better off dead/self-harm past 2 weeks :   Not at all    How difficult have these problems made it for you to do your work, take care of things at home, or get along with other people: Very difficult  Today's CEE-7 Score: 14         Social History     Tobacco Use     Smoking status: Some Days     Smokeless tobacco: Never     Tobacco comments:     Vaping   Substance Use Topics     Alcohol use: Not Currently     Drug use: Never     PHQ 10/10/2022 1/6/2023   PHQ-9 Total Score 17 14   Q9: Thoughts of better off dead/self-harm past 2 weeks Not at all Not at all     CEE-7 SCORE 1/6/2023   Total Score 14 (moderate anxiety)   Total Score 14     Last PHQ-9 1/6/2023   1.  Little interest or pleasure in doing things 2   2.  Feeling down, depressed, or hopeless 1   3.  Trouble falling or staying asleep, or sleeping too much 2   4.  Feeling tired or having little energy 2   5.  Poor appetite or overeating 1   6.  Feeling bad about yourself 1   7.  Trouble concentrating 3   8.  Moving slowly or restless 2   Q9: Thoughts of better off dead/self-harm past 2 weeks 0   PHQ-9 Total Score 14   Difficulty at work, home, or with people -     CEE-7  1/6/2023   1. Feeling nervous, anxious, or on edge 2   2. Not being able to stop or  control worrying 2   3. Worrying too much about different things 2   4. Trouble relaxing 2   5. Being so restless that it is hard to sit still 2   6. Becoming easily annoyed or irritable 2   7. Feeling afraid, as if something awful might happen 2   CEE-7 Total Score 14   If you checked any problems, how difficult have they made it for you to do your work, take care of things at home, or get along with other people? Somewhat difficult     Review of Systems   Constitutional, HEENT, cardiovascular, pulmonary, GI, , musculoskeletal, neuro, skin, endocrine and psych systems are negative, except as otherwise noted in the HPI.      Objective           Vitals:  No vitals were obtained today due to virtual visit.    Physical Exam   GENERAL: Healthy, alert and no distress  EYES: Eyes grossly normal to inspection.  No discharge or erythema, or obvious scleral/conjunctival abnormalities.  RESP: No audible wheeze, cough, or visible cyanosis.  No visible retractions or increased work of breathing.    SKIN: Visible skin clear. No significant rash, abnormal pigmentation or lesions.  NEURO: Cranial nerves grossly intact.  Mentation and speech appropriate for age.  PSYCH: Mentation appears normal, affect normal/bright, judgement and insight intact, normal speech and appearance well-groomed.  Video-Visit Details    Type of service:  Video Visit   Video Start Time: 2:18 PM  Video End Time: 2:26 PM    Originating Location (pt. Location): Home  Distant Location (provider location):  On-site  Platform used for Video Visit: Gogo

## 2023-01-06 NOTE — PATIENT INSTRUCTIONS
[email or web address]      As of April 1, 2018, [facility] suicide prevention and mental health crisis texting services are now available 24 hours a day, seven days a week. People who text [place] to 705097 will be connected to Crisis Text Line. Crisis Text Line handles 50,000 messages per month and over 20 million messages since 2013 from across the [place] connecting people to local resources in their community. For callers who are the most in distress, average wait times for a response is only 39 seconds.        [facility] Mobile Crisis Response Services  (contact the county where the person is physically located at the time of the crisis)  *[place] (Child and Adult):    [phone]  *[place] (Child and Adult):    [phone]  *[place] (Child and Adult):    [phone]  *[place] (Child):    [phone]                           (Adult):    [phone]  *[place] (Child):    [phone]                 (Adult):    [phone]  *[place] (Child and Adult):    [phone]  *[place] (Child and Adult):    [phone]  Other Crisis Resources (non-mobile)  *[facility] (formerly Crisis Intervention Center):    [phone]                                Walk-in and telephone crisis intervention services (focuses on >18 year-olds)                         Located at [address] [address]            Crisis Resources   These hotlines are for both adults and children. They and are open 24 hours a day, 7 days a week unless noted otherwise.    National Suicide Prevention Lifeline   988 or 2-073-155-TALK 8255)    Crisis Text Line    www.crisistextline.org  Text HOME to 481665 from anywhere in the [place], anytime, about any type of crisis. A live, trained crisis counselor will receive the text and respond  alina Crisostomo Lifeline for LGBTQ Youth  A national crisis intervention and suicide lifeline for LGBTQ youth under 25. Provides a safe place to talk without judgement. Call 1-259.266.1446; text START to 034199 or visit [email or web address] to talk to a trained counselor.                 Text Telephone:    7-309-099-4TTY (0300)                 LGBT Youth Suicide Hotline:    0-261-8-U-MARLEEN      *[facility] ( women and children):    904.947.3731  or  901.818.7909  *[facility] Crisis Line ( women and children):    [phone]                                                                                 Short-term Residential Crisis Resources  *[facility]way [facility] (ages 10-17):  [phone]                                               [address] [place] [address]   *[facility] Crisis Nursery (Birth - 6 years):    [phone]  *[facility] Crisis Nursery (Birth - 12 years):    [phone]                                                                             Inpatient Hospitalization and Residential Evaluation  *[facility] (Child and Adult)                                               [address] [place] [address]                                Inpatient Intake [phone]                                [facility]al [facility] Center:    [phone]                                Day Treatment/Behavioral Intake:    [phone]  *[facility] (Adult):    [phone]

## 2023-01-09 ENCOUNTER — PATIENT OUTREACH (OUTPATIENT)
Dept: CARE COORDINATION | Facility: CLINIC | Age: 23
End: 2023-01-09

## 2023-01-09 NOTE — LETTER
[facility] CARE COORDINATION  [facility]  January 9, 2023    Stefanie Sinclair  [address] [address]      Dear Stefanie,        I am a clinic community health worker who works with Sharmaine Carrizales MD with the [facility]. I wanted to thank you for spending the time to talk with me.  Below is a description of clinic care coordination and how I can further assist you.       The clinic care coordination team is made up of a registered nurse, , financial resource worker and community health worker who understand the health care system. The goal of clinic care coordination is to help you manage your health and improve access to the health care system. Our team works alongside your provider to assist you in determining your health and social needs. We can help you obtain health care and community resources, providing you with necessary information and education. We can work with you through any barriers and develop a care plan that helps coordinate and strengthen the communication between you and your care team.    Please feel free to contact me with any questions or concerns regarding care coordination and what we can offer.      We are focused on providing you with the highest-quality healthcare experience possible.    Sincerely,       GIOVANNI Max. Public Health  Community Health Worker  [facility]  Clinic Care Coordination  [phone]

## 2023-01-09 NOTE — PROGRESS NOTES
Clinic Care Coordination Contact  Community Health Worker Initial Outreach    CHW Initial Information Gathering:  Referral Source: PCP  No PCP office visit in Past Year: No     Reason for Referral: Other  My Clinical Question Is: Please help her to get a new PCP in her area  Clinical Staff have discussed the Care Coordination Referral with the patient and/or caregiver: Yes    Patient accepts CC: No, no CC needs . Patient will be sent Care Coordination introduction letter for future reference.     1-9, CHW:    Writer was able to connect with the Pt and introduce self/care coordination and intent of call.    Writer explained that Pt can look online at providers at the primary care clinic she would like to go to and from there pick a provider who is accepting new patients.     Writer explained this can also be done through [facility] or through calling the clinic directly. Pt voiced understanding.     Writer let the Pt know that she can also request to speak with a  once she establishes care.     At this time, Pt declined CC services. Writer encouraged Pt to reach out in the future with any questions or concerns; Pt agreeable with plan.       GIOVANNI Max. Public Health  Community Health Worker  [place] [place] [facility]  Clinic Care Coordination  [phone]

## 2023-07-12 ENCOUNTER — OFFICE VISIT (OUTPATIENT)
Dept: FAMILY MEDICINE | Facility: CLINIC | Age: 23
End: 2023-07-12
Payer: COMMERCIAL

## 2023-07-12 VITALS
HEIGHT: 64 IN | WEIGHT: 147.56 LBS | DIASTOLIC BLOOD PRESSURE: 70 MMHG | TEMPERATURE: 97.4 F | OXYGEN SATURATION: 98 % | BODY MASS INDEX: 25.19 KG/M2 | SYSTOLIC BLOOD PRESSURE: 110 MMHG | HEART RATE: 98 BPM

## 2023-07-12 DIAGNOSIS — H92.03 OTALGIA OF BOTH EARS: ICD-10-CM

## 2023-07-12 DIAGNOSIS — L29.9 EAR ITCH: Primary | ICD-10-CM

## 2023-07-12 DIAGNOSIS — M26.609 TMJ (TEMPOROMANDIBULAR JOINT SYNDROME): ICD-10-CM

## 2023-07-12 PROCEDURE — 99213 OFFICE O/P EST LOW 20 MIN: CPT | Performed by: STUDENT IN AN ORGANIZED HEALTH CARE EDUCATION/TRAINING PROGRAM

## 2023-07-12 RX ORDER — HYDROCORTISONE 25 MG/G
OINTMENT TOPICAL 2 TIMES DAILY
Qty: 30 G | Refills: 1 | Status: SHIPPED | OUTPATIENT
Start: 2023-07-12

## 2023-07-12 RX ORDER — NICOTINE POLACRILEX 4 MG/1
20 GUM, CHEWING ORAL DAILY
COMMUNITY

## 2023-07-12 ASSESSMENT — PAIN SCALES - GENERAL: PAINLEVEL: MODERATE PAIN (5)

## 2023-07-12 NOTE — PATIENT INSTRUCTIONS
Take 400 mg ibuprofen twice daily for two weeks  Use hydrocortisone on finger and put in ear twice daily  Make appointment with ENT for further workup

## 2023-10-21 ENCOUNTER — HEALTH MAINTENANCE LETTER (OUTPATIENT)
Age: 23
End: 2023-10-21

## 2024-02-14 ENCOUNTER — NURSE TRIAGE (OUTPATIENT)
Dept: NURSING | Facility: CLINIC | Age: 24
End: 2024-02-14

## 2024-02-15 NOTE — TELEPHONE ENCOUNTER
Nurse Triage SBAR    Is this a 2nd Level Triage? NO    Situation: Cat Bite    Background: :na    Assessment: Patient was bit by her cat on her left arm at the elbow and bicep yesterday. She reports puncture marks, warm to touch, redness increasing, and pain increasing. She denies fever or bleeding. Patient reports that she has been taking 500 mg amoxicillin every 8 hours.     Protocol Recommended Disposition:   According to the protocol, patient should see provider within 4 hours (or PCP Triage), no [facility] PCP, advised to go to ED.  Care advice given. Patient verbalizes understanding and declined disposition, she would like to be evaluated in clinic. Transferred to scheduling.     Kaitlynn Meade RN  02/14/24 11:06 PM  [facility] Nurse Advisor      Reason for Disposition   [1] Puncture wound (hole through the skin) AND [2] from a cat bite (or deep claw puncture wound)    Additional Information   Negative: [1] Major bleeding (e.g., actively dripping or spurting) AND [2] can't be stopped   Negative: Sounds like a life-threatening emergency to the triager   Negative: Snake bite   Negative: Bite, wound, or sting from fish   Negative: [1] Any break in skin from BITE (e.g., cut, puncture or scratch) AND[2] WILD animal at risk for RABIES (e.g., bat, raccoon, tesfaye, skunk, coyote, other carnivores; see Background for list.)   Negative: [1] Any break in skin from BITE (e.g., cut, puncture or scratch) AND[2] PET animal (e.g., dog, cat, or ferret) at risk for RABIES (e.g., sick, stray, unprovoked bite, developing country)   Negative: [1] Any break in skin from BITE (e.g., cut, puncture or scratch) AND[2] monkey   Negative: [1] EXPOSURE of non-intact skin (e.g., exposed person has dermatitis, abrasion, wound) AND[2] with animal BODY FLUID (e.g., saliva such as licking, blood, brain) AND[3] animal at high-risk for RABIES (e.g., bat, raccoon, tesfaye, skunk, coyote, other carnivores)   Negative: [1] Cut (length > 1/8 inch or 3  mm) or skin tear AND [2] any animal  (Exception: Superficial scratch that doesn't go through dermis.)   Negative: [1] Bleeding AND [2] won't stop after 10 minutes of direct pressure (using correct technique)   Negative: Description of bite sounds severe to the triager    Protocols used: Animal Bite-A-AH

## 2024-03-03 ENCOUNTER — HEALTH MAINTENANCE LETTER (OUTPATIENT)
Age: 24
End: 2024-03-03

## 2024-05-13 ENCOUNTER — PATIENT OUTREACH (OUTPATIENT)
Dept: CARE COORDINATION | Facility: CLINIC | Age: 24
End: 2024-05-13

## 2025-03-16 ENCOUNTER — HEALTH MAINTENANCE LETTER (OUTPATIENT)
Age: 25
End: 2025-03-16

## 2025-05-01 ENCOUNTER — OFFICE VISIT (OUTPATIENT)
Dept: URGENT CARE | Facility: URGENT CARE | Age: 25
End: 2025-05-01

## 2025-05-01 VITALS
BODY MASS INDEX: 22.66 KG/M2 | SYSTOLIC BLOOD PRESSURE: 126 MMHG | RESPIRATION RATE: 16 BRPM | HEART RATE: 89 BPM | DIASTOLIC BLOOD PRESSURE: 85 MMHG | OXYGEN SATURATION: 100 % | TEMPERATURE: 97.9 F | WEIGHT: 132 LBS

## 2025-05-01 DIAGNOSIS — R07.0 THROAT PAIN: Primary | ICD-10-CM

## 2025-05-01 LAB
BASOPHILS # BLD AUTO: 0.1 10E3/UL (ref 0–0.2)
BASOPHILS NFR BLD AUTO: 1 %
DEPRECATED S PYO AG THROAT QL EIA: NEGATIVE
EOSINOPHIL # BLD AUTO: 0.1 10E3/UL (ref 0–0.7)
EOSINOPHIL NFR BLD AUTO: 1 %
ERYTHROCYTE [DISTWIDTH] IN BLOOD BY AUTOMATED COUNT: 12 % (ref 10–15)
HCT VFR BLD AUTO: 42.5 % (ref 35–47)
HGB BLD-MCNC: 13.9 G/DL (ref 11.7–15.7)
IMM GRANULOCYTES # BLD: 0 10E3/UL
IMM GRANULOCYTES NFR BLD: 0 %
LYMPHOCYTES # BLD AUTO: 2 10E3/UL (ref 0.8–5.3)
LYMPHOCYTES NFR BLD AUTO: 24 %
MCH RBC QN AUTO: 29.4 PG (ref 26.5–33)
MCHC RBC AUTO-ENTMCNC: 32.7 G/DL (ref 31.5–36.5)
MCV RBC AUTO: 90 FL (ref 78–100)
MONOCYTES # BLD AUTO: 0.5 10E3/UL (ref 0–1.3)
MONOCYTES NFR BLD AUTO: 6 %
MONOCYTES NFR BLD AUTO: NEGATIVE %
NEUTROPHILS # BLD AUTO: 5.8 10E3/UL (ref 1.6–8.3)
NEUTROPHILS NFR BLD AUTO: 68 %
PLATELET # BLD AUTO: 272 10E3/UL (ref 150–450)
RBC # BLD AUTO: 4.72 10E6/UL (ref 3.8–5.2)
S PYO DNA THROAT QL NAA+PROBE: NOT DETECTED
WBC # BLD AUTO: 8.5 10E3/UL (ref 4–11)

## 2025-05-01 ASSESSMENT — ENCOUNTER SYMPTOMS
FEVER: 1
RHINORRHEA: 1
FATIGUE: 1
COUGH: 1
SORE THROAT: 1
ADENOPATHY: 1
APPETITE CHANGE: 0

## 2025-05-01 NOTE — PROGRESS NOTES
SUBJECTIVE:   Stefanie Sinclair is a 24 year old female presenting with a chief complaint of   Chief Complaint   Patient presents with    Urgent Care    Throat Problem     Pt presents throat pain, swollen lymph nodes, body aches(at night), runny nose, onset 1 week and half. Pt had strep before and it feels similar.        She is an established patient of [facility].   Patient presents with 10 days of ST, and tactile fever, fatigue, small cough.      Treatment:  cough gtts; zicam    Review of Systems   Constitutional:  Positive for fatigue and fever. Negative for appetite change.   HENT:  Positive for congestion, rhinorrhea and sore throat. Negative for ear pain.    Respiratory:  Positive for cough.    Hematological:  Positive for adenopathy.   All other systems reviewed and are negative.      Past Medical History:   Diagnosis Date    Chicken pox     History of sprained elbow 2012    left    Unspecified viral infection, in conditions classified elsewhere and of unspecified site     Created by Conversion      Family History   Problem Relation Age of Onset    Hypertension Mother     Hypertension Maternal Grandmother     Diabetes Maternal Grandfather     Prostate Cancer Maternal Grandfather     Attention Deficit Disorder Father     Alcoholism Father     Anxiety Disorder Father     Attention Deficit Disorder Brother     Tuberculosis Paternal Grandfather     Diabetes Paternal Grandfather     Schizophrenia Paternal Grandmother     Anxiety Disorder Paternal Grandmother      Current Outpatient Medications   Medication Sig Dispense Refill    acetaminophen (TYLENOL) 325 MG tablet [ACETAMINOPHEN (TYLENOL) 325 MG TABLET] Take 650 mg by mouth every 6 (six) hours as needed for pain.      Aspirin-Acetaminophen-Caffeine (EXCEDRIN PO)       dextroamphetamine-amphetamine (ADDERALL) 15 mg Tab Take 15 mg by mouth as needed      hydrocortisone 2.5 % ointment Apply topically 2 times daily 30 g 1    ibuprofen (ADVIL,MOTRIN) 200 MG tablet  [IBUPROFEN (ADVIL,MOTRIN) 200 MG TABLET] Take 200 mg by mouth every 6 (six) hours as needed for pain.      levonorgestrel-ethinyl estradiol (AVIANE,ALESSE,LESSINA) 0.1-20 mg-mcg per tablet [LEVONORGESTREL-ETHINYL ESTRADIOL (AVIANE,ALESSE,LESSINA) 0.1-20 MG-MCG PER TABLET] Take 1 tablet by mouth daily. 3 Package 4    omeprazole 20 MG tablet Take 20 mg by mouth daily       Social History     Tobacco Use    Smoking status: Some Days     Types: Vaping Device    Smokeless tobacco: Never    Tobacco comments:     Vaping   Substance Use Topics    Alcohol use: Not Currently       OBJECTIVE  /85   Pulse 89   Temp 97.9  F (36.6  C) (Tympanic)   Resp 16   Wt 59.9 kg (132 lb)   SpO2 100%   BMI 22.66 kg/m      Physical Exam  Vitals and nursing note reviewed.   Constitutional:       Appearance: Normal appearance. She is normal weight.   HENT:      Head: Normocephalic and atraumatic.      Right Ear: Tympanic membrane, ear canal and external ear normal.      Left Ear: Tympanic membrane, ear canal and external ear normal.      Nose: Nose normal.      Mouth/Throat:      Mouth: Mucous membranes are moist.      Pharynx: Oropharynx is clear. Posterior oropharyngeal erythema present.   Eyes:      Extraocular Movements: Extraocular movements intact.      Conjunctiva/sclera: Conjunctivae normal.   Cardiovascular:      Rate and Rhythm: Normal rate and regular rhythm.      Pulses: Normal pulses.      Heart sounds: Normal heart sounds.   Pulmonary:      Effort: Pulmonary effort is normal.      Breath sounds: Normal breath sounds.   Musculoskeletal:      Cervical back: Normal range of motion.   Skin:     General: Skin is warm and dry.      Findings: No rash.   Neurological:      General: No focal deficit present.      Mental Status: She is alert.   Psychiatric:         Mood and Affect: Mood normal.         Behavior: Behavior normal.         Labs:  Results for orders placed or performed in visit on 05/01/25 (from the past 24 hours)    Streptococcus A Rapid Screen w/Reflex to PCR - Clinic Collect    Specimen: Throat; Swab   Result Value Ref Range    Group A Strep antigen Negative Negative   CBC with platelets and differential    Narrative    The following orders were created for panel order CBC with platelets and differential.  Procedure                               Abnormality         Status                     ---------                               -----------         ------                     CBC with platelets and ...[[id number]]                      Final result                 Please view results for these tests on the individual orders.   Mononucleosis screen   Result Value Ref Range    Mononucleosis Screen Negative Negative   CBC with platelets and differential   Result Value Ref Range    WBC Count 8.5 4.0 - 11.0 10e3/uL    RBC Count 4.72 3.80 - 5.20 10e6/uL    Hemoglobin 13.9 11.7 - 15.7 g/dL    Hematocrit 42.5 35.0 - 47.0 %    MCV 90 78 - 100 fL    MCH 29.4 26.5 - 33.0 pg    MCHC 32.7 31.5 - 36.5 g/dL    RDW 12.0 10.0 - 15.0 %    Platelet Count 272 150 - 450 10e3/uL    % Neutrophils 68 %    % Lymphocytes 24 %    % Monocytes 6 %    % Eosinophils 1 %    % Basophils 1 %    % Immature Granulocytes 0 %    Absolute Neutrophils 5.8 1.6 - 8.3 10e3/uL    Absolute Lymphocytes 2.0 0.8 - 5.3 10e3/uL    Absolute Monocytes 0.5 0.0 - 1.3 10e3/uL    Absolute Eosinophils 0.1 0.0 - 0.7 10e3/uL    Absolute Basophils 0.1 0.0 - 0.2 10e3/uL    Absolute Immature Granulocytes 0.0 <=0.4 10e3/uL       ASSESSMENT:      ICD-10-CM    1. Throat pain  R07.0 Streptococcus A Rapid Screen w/Reflex to PCR - Clinic Collect     Group A Streptococcus PCR Throat Swab     CBC with platelets and differential     Mononucleosis screen     CBC with platelets and differential     Mononucleosis screen           Medical Decision Making:    Differential Diagnosis:  URI Adult/Peds:  Strep pharyngitis, Viral pharyngitis, Viral syndrome, and Viral upper respiratory  illness    Serious Comorbid Conditions:  Adult:   reviewed    PLAN:    Tylenol/motrin as needed.  Drink plenty of water.  Gargle with salt water.  May use chloraseptic spray as directed for ST.  Strep pcr pending.      Followup:    If not improving or if condition worsens, follow up with your Primary Care Provider, If not improving or if conditions worsens over the next 12-24 hours, go to the Emergency Department    There are no Patient Instructions on file for this visit.

## 2025-05-01 NOTE — PROGRESS NOTES
Urgent Care Clinic Visit    Chief Complaint   Patient presents with    Urgent Care    Throat Problem     Pt presents throat pain, swollen lymph nodes, body aches(at night), runny nose, onset 1 week and half. Pt had strep before and it feels similar.                5/1/2025     3:34 PM   Additional Questions   Roomed by Xiao Park   Accompanied by none     No  Does the patient have a sore throat and either history of fever >100.4 in the previous 24 hours without a cough or recent exposure to a known case of strep throat? Yes